# Patient Record
Sex: FEMALE | Race: WHITE | NOT HISPANIC OR LATINO | Employment: FULL TIME | URBAN - METROPOLITAN AREA
[De-identification: names, ages, dates, MRNs, and addresses within clinical notes are randomized per-mention and may not be internally consistent; named-entity substitution may affect disease eponyms.]

---

## 2023-12-26 ENCOUNTER — HOSPITAL ENCOUNTER (EMERGENCY)
Facility: HOSPITAL | Age: 48
Discharge: LEFT BEFORE DISCHARGE | DRG: 123 | End: 2023-12-27
Payer: COMMERCIAL

## 2023-12-26 ENCOUNTER — HOSPITAL ENCOUNTER (EMERGENCY)
Facility: HOSPITAL | Age: 48
Discharge: STILL ADMITTED OR ADMINISTRATIVE | End: 2023-12-27
Payer: COMMERCIAL

## 2023-12-26 ENCOUNTER — APPOINTMENT (OUTPATIENT)
Dept: RADIOLOGY | Facility: HOSPITAL | Age: 48
DRG: 123 | End: 2023-12-26
Payer: COMMERCIAL

## 2023-12-26 VITALS
SYSTOLIC BLOOD PRESSURE: 157 MMHG | DIASTOLIC BLOOD PRESSURE: 97 MMHG | OXYGEN SATURATION: 100 % | RESPIRATION RATE: 18 BRPM | HEART RATE: 94 BPM | TEMPERATURE: 98.2 F

## 2023-12-26 DIAGNOSIS — H46.9 OPTIC NEURITIS: Primary | ICD-10-CM

## 2023-12-26 LAB
ALBUMIN SERPL BCP-MCNC: 4.4 G/DL (ref 3.4–5)
ALP SERPL-CCNC: 51 U/L (ref 33–110)
ALT SERPL W P-5'-P-CCNC: 19 U/L (ref 7–45)
ANION GAP SERPL CALC-SCNC: 12 MMOL/L (ref 10–20)
APTT PPP: 42 SECONDS (ref 27–38)
AST SERPL W P-5'-P-CCNC: 20 U/L (ref 9–39)
BASOPHILS # BLD AUTO: 0.05 X10*3/UL (ref 0–0.1)
BASOPHILS NFR BLD AUTO: 0.5 %
BILIRUB DIRECT SERPL-MCNC: 0.1 MG/DL (ref 0–0.3)
BILIRUB SERPL-MCNC: 0.2 MG/DL (ref 0–1.2)
BUN SERPL-MCNC: 7 MG/DL (ref 6–23)
CALCIUM SERPL-MCNC: 9.9 MG/DL (ref 8.6–10.6)
CHLORIDE SERPL-SCNC: 107 MMOL/L (ref 98–107)
CO2 SERPL-SCNC: 26 MMOL/L (ref 21–32)
CREAT SERPL-MCNC: 0.61 MG/DL (ref 0.5–1.05)
EOSINOPHIL # BLD AUTO: 0.11 X10*3/UL (ref 0–0.7)
EOSINOPHIL NFR BLD AUTO: 1.2 %
ERYTHROCYTE [DISTWIDTH] IN BLOOD BY AUTOMATED COUNT: 16.2 % (ref 11.5–14.5)
GFR SERPL CREATININE-BSD FRML MDRD: >90 ML/MIN/1.73M*2
GLUCOSE SERPL-MCNC: 106 MG/DL (ref 74–99)
HCT VFR BLD AUTO: 36.5 % (ref 36–46)
HGB BLD-MCNC: 12.5 G/DL (ref 12–16)
IMM GRANULOCYTES # BLD AUTO: 0.04 X10*3/UL (ref 0–0.7)
IMM GRANULOCYTES NFR BLD AUTO: 0.4 % (ref 0–0.9)
INR PPP: 0.9 (ref 0.9–1.1)
LYMPHOCYTES # BLD AUTO: 3.5 X10*3/UL (ref 1.2–4.8)
LYMPHOCYTES NFR BLD AUTO: 37.9 %
MCH RBC QN AUTO: 28.7 PG (ref 26–34)
MCHC RBC AUTO-ENTMCNC: 34.2 G/DL (ref 32–36)
MCV RBC AUTO: 84 FL (ref 80–100)
MONOCYTES # BLD AUTO: 0.72 X10*3/UL (ref 0.1–1)
MONOCYTES NFR BLD AUTO: 7.8 %
NEUTROPHILS # BLD AUTO: 4.82 X10*3/UL (ref 1.2–7.7)
NEUTROPHILS NFR BLD AUTO: 52.2 %
NRBC BLD-RTO: 0 /100 WBCS (ref 0–0)
PLATELET # BLD AUTO: 462 X10*3/UL (ref 150–450)
POTASSIUM SERPL-SCNC: 3.5 MMOL/L (ref 3.5–5.3)
PROT SERPL-MCNC: 7.6 G/DL (ref 6.4–8.2)
PROTHROMBIN TIME: 10 SECONDS (ref 9.8–12.8)
RBC # BLD AUTO: 4.36 X10*6/UL (ref 4–5.2)
SODIUM SERPL-SCNC: 141 MMOL/L (ref 136–145)
WBC # BLD AUTO: 9.2 X10*3/UL (ref 4.4–11.3)

## 2023-12-26 PROCEDURE — 85610 PROTHROMBIN TIME: CPT | Performed by: NURSE PRACTITIONER

## 2023-12-26 PROCEDURE — 4500999001 HC ED NO CHARGE

## 2023-12-26 PROCEDURE — 99285 EMERGENCY DEPT VISIT HI MDM: CPT

## 2023-12-26 PROCEDURE — 80053 COMPREHEN METABOLIC PANEL: CPT | Performed by: NURSE PRACTITIONER

## 2023-12-26 PROCEDURE — 82248 BILIRUBIN DIRECT: CPT | Performed by: NURSE PRACTITIONER

## 2023-12-26 PROCEDURE — 70543 MRI ORBT/FAC/NCK W/O &W/DYE: CPT

## 2023-12-26 PROCEDURE — 36415 COLL VENOUS BLD VENIPUNCTURE: CPT | Performed by: NURSE PRACTITIONER

## 2023-12-26 PROCEDURE — 85730 THROMBOPLASTIN TIME PARTIAL: CPT | Performed by: NURSE PRACTITIONER

## 2023-12-26 PROCEDURE — 85025 COMPLETE CBC W/AUTO DIFF WBC: CPT | Performed by: NURSE PRACTITIONER

## 2023-12-26 PROCEDURE — 70553 MRI BRAIN STEM W/O & W/DYE: CPT

## 2023-12-26 PROCEDURE — 99285 EMERGENCY DEPT VISIT HI MDM: CPT | Performed by: NURSE PRACTITIONER

## 2023-12-26 RX ORDER — GADOTERATE MEGLUMINE 376.9 MG/ML
20 INJECTION INTRAVENOUS
Status: COMPLETED | OUTPATIENT
Start: 2023-12-26 | End: 2023-12-27

## 2023-12-26 ASSESSMENT — COLUMBIA-SUICIDE SEVERITY RATING SCALE - C-SSRS
1. IN THE PAST MONTH, HAVE YOU WISHED YOU WERE DEAD OR WISHED YOU COULD GO TO SLEEP AND NOT WAKE UP?: NO
2. HAVE YOU ACTUALLY HAD ANY THOUGHTS OF KILLING YOURSELF?: NO
6. HAVE YOU EVER DONE ANYTHING, STARTED TO DO ANYTHING, OR PREPARED TO DO ANYTHING TO END YOUR LIFE?: NO

## 2023-12-26 NOTE — ED TRIAGE NOTES
1 WK HX OF LEFT VISION LOST, SMALL AMOUNT OF PERIPHERAL REMAINS. PT WAS SEEN BY OPHTHO THURSDAY AND TOLD OPTIC NEURITIS. SHE REPORTS HA, NAUSEA. DENIES DIZZINESS, SYNCOPE, VOMITING.

## 2023-12-27 ENCOUNTER — TELEPHONE (OUTPATIENT)
Dept: OPHTHALMOLOGY | Facility: CLINIC | Age: 48
End: 2023-12-27
Payer: COMMERCIAL

## 2023-12-27 ENCOUNTER — HOSPITAL ENCOUNTER (INPATIENT)
Facility: HOSPITAL | Age: 48
LOS: 5 days | Discharge: HOME | DRG: 123 | End: 2024-01-01
Attending: STUDENT IN AN ORGANIZED HEALTH CARE EDUCATION/TRAINING PROGRAM | Admitting: PSYCHIATRY & NEUROLOGY
Payer: COMMERCIAL

## 2023-12-27 DIAGNOSIS — R76.8 ANA POSITIVE: ICD-10-CM

## 2023-12-27 DIAGNOSIS — H46.9 OPTIC NEURITIS, LEFT: Primary | ICD-10-CM

## 2023-12-27 DIAGNOSIS — F17.210 CIGARETTE SMOKER: ICD-10-CM

## 2023-12-27 PROBLEM — Z98.84 PERSONAL HISTORY OF GASTRIC BYPASS: Status: ACTIVE | Noted: 2023-12-27

## 2023-12-27 PROBLEM — E66.9 SIMPLE OBESITY: Status: ACTIVE | Noted: 2023-12-27

## 2023-12-27 PROBLEM — F41.8 DEPRESSION WITH ANXIETY: Status: ACTIVE | Noted: 2023-12-27

## 2023-12-27 LAB
ALBUMIN SERPL BCP-MCNC: 4.5 G/DL (ref 3.4–5)
ALP SERPL-CCNC: 55 U/L (ref 33–110)
ALT SERPL W P-5'-P-CCNC: 20 U/L (ref 7–45)
ANION GAP SERPL CALC-SCNC: 14 MMOL/L (ref 10–20)
AST SERPL W P-5'-P-CCNC: 27 U/L (ref 9–39)
BASOPHILS # BLD AUTO: 0.06 X10*3/UL (ref 0–0.1)
BASOPHILS NFR BLD AUTO: 0.7 %
BILIRUB SERPL-MCNC: 0.2 MG/DL (ref 0–1.2)
BUN SERPL-MCNC: 5 MG/DL (ref 6–23)
CALCIUM SERPL-MCNC: 10 MG/DL (ref 8.6–10.6)
CHLORIDE SERPL-SCNC: 107 MMOL/L (ref 98–107)
CO2 SERPL-SCNC: 23 MMOL/L (ref 21–32)
CREAT SERPL-MCNC: 0.64 MG/DL (ref 0.5–1.05)
EOSINOPHIL # BLD AUTO: 0.08 X10*3/UL (ref 0–0.7)
EOSINOPHIL NFR BLD AUTO: 0.9 %
ERYTHROCYTE [DISTWIDTH] IN BLOOD BY AUTOMATED COUNT: 15.6 % (ref 11.5–14.5)
GFR SERPL CREATININE-BSD FRML MDRD: >90 ML/MIN/1.73M*2
GLUCOSE SERPL-MCNC: 80 MG/DL (ref 74–99)
HCT VFR BLD AUTO: 35.4 % (ref 36–46)
HGB BLD-MCNC: 12.3 G/DL (ref 12–16)
IMM GRANULOCYTES # BLD AUTO: 0.02 X10*3/UL (ref 0–0.7)
IMM GRANULOCYTES NFR BLD AUTO: 0.2 % (ref 0–0.9)
LYMPHOCYTES # BLD AUTO: 2.8 X10*3/UL (ref 1.2–4.8)
LYMPHOCYTES NFR BLD AUTO: 32.4 %
MCH RBC QN AUTO: 28 PG (ref 26–34)
MCHC RBC AUTO-ENTMCNC: 34.7 G/DL (ref 32–36)
MCV RBC AUTO: 81 FL (ref 80–100)
MONOCYTES # BLD AUTO: 0.52 X10*3/UL (ref 0.1–1)
MONOCYTES NFR BLD AUTO: 6 %
NEUTROPHILS # BLD AUTO: 5.17 X10*3/UL (ref 1.2–7.7)
NEUTROPHILS NFR BLD AUTO: 59.8 %
NRBC BLD-RTO: 0 /100 WBCS (ref 0–0)
PLATELET # BLD AUTO: 165 X10*3/UL (ref 150–450)
POTASSIUM SERPL-SCNC: 3.9 MMOL/L (ref 3.5–5.3)
PROT SERPL-MCNC: 8.2 G/DL (ref 6.4–8.2)
RBC # BLD AUTO: 4.4 X10*6/UL (ref 4–5.2)
SODIUM SERPL-SCNC: 140 MMOL/L (ref 136–145)
WBC # BLD AUTO: 8.7 X10*3/UL (ref 4.4–11.3)

## 2023-12-27 PROCEDURE — 99285 EMERGENCY DEPT VISIT HI MDM: CPT

## 2023-12-27 PROCEDURE — 36415 COLL VENOUS BLD VENIPUNCTURE: CPT

## 2023-12-27 PROCEDURE — S4991 NICOTINE PATCH NONLEGEND: HCPCS

## 2023-12-27 PROCEDURE — 2500000001 HC RX 250 WO HCPCS SELF ADMINISTERED DRUGS (ALT 637 FOR MEDICARE OP): Performed by: STUDENT IN AN ORGANIZED HEALTH CARE EDUCATION/TRAINING PROGRAM

## 2023-12-27 PROCEDURE — 80053 COMPREHEN METABOLIC PANEL: CPT | Performed by: STUDENT IN AN ORGANIZED HEALTH CARE EDUCATION/TRAINING PROGRAM

## 2023-12-27 PROCEDURE — 94760 N-INVAS EAR/PLS OXIMETRY 1: CPT

## 2023-12-27 PROCEDURE — 2500000004 HC RX 250 GENERAL PHARMACY W/ HCPCS (ALT 636 FOR OP/ED): Performed by: STUDENT IN AN ORGANIZED HEALTH CARE EDUCATION/TRAINING PROGRAM

## 2023-12-27 PROCEDURE — 99285 EMERGENCY DEPT VISIT HI MDM: CPT | Performed by: STUDENT IN AN ORGANIZED HEALTH CARE EDUCATION/TRAINING PROGRAM

## 2023-12-27 PROCEDURE — 2500000001 HC RX 250 WO HCPCS SELF ADMINISTERED DRUGS (ALT 637 FOR MEDICARE OP)

## 2023-12-27 PROCEDURE — 85025 COMPLETE CBC W/AUTO DIFF WBC: CPT

## 2023-12-27 PROCEDURE — 2500000002 HC RX 250 W HCPCS SELF ADMINISTERED DRUGS (ALT 637 FOR MEDICARE OP, ALT 636 FOR OP/ED)

## 2023-12-27 PROCEDURE — 2550000001 HC RX 255 CONTRASTS: Performed by: NURSE PRACTITIONER

## 2023-12-27 PROCEDURE — 99223 1ST HOSP IP/OBS HIGH 75: CPT | Performed by: STUDENT IN AN ORGANIZED HEALTH CARE EDUCATION/TRAINING PROGRAM

## 2023-12-27 PROCEDURE — A9575 INJ GADOTERATE MEGLUMI 0.1ML: HCPCS | Performed by: NURSE PRACTITIONER

## 2023-12-27 PROCEDURE — 70543 MRI ORBT/FAC/NCK W/O &W/DYE: CPT | Performed by: SURGERY

## 2023-12-27 PROCEDURE — 1100000001 HC PRIVATE ROOM DAILY

## 2023-12-27 PROCEDURE — 70553 MRI BRAIN STEM W/O & W/DYE: CPT | Performed by: SURGERY

## 2023-12-27 PROCEDURE — 96372 THER/PROPH/DIAG INJ SC/IM: CPT | Performed by: STUDENT IN AN ORGANIZED HEALTH CARE EDUCATION/TRAINING PROGRAM

## 2023-12-27 RX ORDER — ENOXAPARIN SODIUM 100 MG/ML
40 INJECTION SUBCUTANEOUS EVERY 24 HOURS
Status: DISCONTINUED | OUTPATIENT
Start: 2023-12-27 | End: 2024-01-01 | Stop reason: HOSPADM

## 2023-12-27 RX ORDER — ENOXAPARIN SODIUM 100 MG/ML
40 INJECTION SUBCUTANEOUS EVERY 24 HOURS
Status: CANCELLED | OUTPATIENT
Start: 2023-12-27

## 2023-12-27 RX ORDER — LORAZEPAM 1 MG/1
1 TABLET ORAL ONCE AS NEEDED
Status: COMPLETED | OUTPATIENT
Start: 2023-12-27 | End: 2023-12-28

## 2023-12-27 RX ORDER — AMOXICILLIN 250 MG
1 CAPSULE ORAL NIGHTLY PRN
Status: DISCONTINUED | OUTPATIENT
Start: 2023-12-27 | End: 2024-01-01 | Stop reason: HOSPADM

## 2023-12-27 RX ORDER — IBUPROFEN 200 MG
1 TABLET ORAL DAILY
Status: CANCELLED | OUTPATIENT
Start: 2023-12-27

## 2023-12-27 RX ORDER — ACETAMINOPHEN 325 MG/1
650 TABLET ORAL EVERY 4 HOURS PRN
Status: DISCONTINUED | OUTPATIENT
Start: 2023-12-27 | End: 2023-12-30

## 2023-12-27 RX ORDER — LORAZEPAM 0.5 MG/1
0.5 TABLET ORAL ONCE
Status: COMPLETED | OUTPATIENT
Start: 2023-12-27 | End: 2023-12-27

## 2023-12-27 RX ORDER — ACETAMINOPHEN 500 MG
5 TABLET ORAL NIGHTLY PRN
Status: DISCONTINUED | OUTPATIENT
Start: 2023-12-27 | End: 2024-01-01 | Stop reason: HOSPADM

## 2023-12-27 RX ORDER — LANOLIN ALCOHOL/MO/W.PET/CERES
500 CREAM (GRAM) TOPICAL DAILY
Status: COMPLETED | OUTPATIENT
Start: 2023-12-27 | End: 2023-12-29

## 2023-12-27 RX ORDER — IBUPROFEN 200 MG
1 TABLET ORAL DAILY
Status: DISCONTINUED | OUTPATIENT
Start: 2023-12-27 | End: 2024-01-01 | Stop reason: HOSPADM

## 2023-12-27 RX ORDER — LORAZEPAM 2 MG/ML
1 INJECTION INTRAMUSCULAR ONCE AS NEEDED
Status: DISCONTINUED | OUTPATIENT
Start: 2023-12-27 | End: 2023-12-27

## 2023-12-27 RX ADMIN — THIAMINE HCL TAB 100 MG 500 MG: 100 TAB at 18:09

## 2023-12-27 RX ADMIN — ENOXAPARIN SODIUM 40 MG: 100 INJECTION SUBCUTANEOUS at 18:09

## 2023-12-27 RX ADMIN — LORAZEPAM 0.5 MG: 0.5 TABLET ORAL at 14:04

## 2023-12-27 RX ADMIN — NICOTINE 1 PATCH: 14 PATCH, EXTENDED RELEASE TRANSDERMAL at 18:09

## 2023-12-27 RX ADMIN — GADOTERATE MEGLUMINE 17 ML: 376.9 INJECTION INTRAVENOUS at 00:02

## 2023-12-27 SDOH — SOCIAL STABILITY: SOCIAL INSECURITY: DO YOU FEEL ANYONE HAS EXPLOITED OR TAKEN ADVANTAGE OF YOU FINANCIALLY OR OF YOUR PERSONAL PROPERTY?: NO

## 2023-12-27 SDOH — SOCIAL STABILITY: SOCIAL INSECURITY: ABUSE: ADULT

## 2023-12-27 SDOH — SOCIAL STABILITY: SOCIAL INSECURITY: ARE YOU OR HAVE YOU BEEN THREATENED OR ABUSED PHYSICALLY, EMOTIONALLY, OR SEXUALLY BY ANYONE?: NO

## 2023-12-27 SDOH — SOCIAL STABILITY: SOCIAL INSECURITY: HAS ANYONE EVER THREATENED TO HURT YOUR FAMILY OR YOUR PETS?: NO

## 2023-12-27 SDOH — SOCIAL STABILITY: SOCIAL INSECURITY: ARE THERE ANY APPARENT SIGNS OF INJURIES/BEHAVIORS THAT COULD BE RELATED TO ABUSE/NEGLECT?: NO

## 2023-12-27 SDOH — SOCIAL STABILITY: SOCIAL INSECURITY: HAVE YOU HAD THOUGHTS OF HARMING ANYONE ELSE?: NO

## 2023-12-27 SDOH — SOCIAL STABILITY: SOCIAL INSECURITY: DOES ANYONE TRY TO KEEP YOU FROM HAVING/CONTACTING OTHER FRIENDS OR DOING THINGS OUTSIDE YOUR HOME?: NO

## 2023-12-27 SDOH — SOCIAL STABILITY: SOCIAL INSECURITY: DO YOU FEEL UNSAFE GOING BACK TO THE PLACE WHERE YOU ARE LIVING?: NO

## 2023-12-27 SDOH — SOCIAL STABILITY: SOCIAL INSECURITY: WERE YOU ABLE TO COMPLETE ALL THE BEHAVIORAL HEALTH SCREENINGS?: YES

## 2023-12-27 ASSESSMENT — COGNITIVE AND FUNCTIONAL STATUS - GENERAL
PATIENT BASELINE BEDBOUND: NO
MOBILITY SCORE: 24
MOBILITY SCORE: 24
DAILY ACTIVITIY SCORE: 24

## 2023-12-27 ASSESSMENT — LIFESTYLE VARIABLES
HOW OFTEN DURING THE LAST YEAR HAVE YOU NEEDED AN ALCOHOLIC DRINK FIRST THING IN THE MORNING TO GET YOURSELF GOING AFTER A NIGHT OF HEAVY DRINKING: LESS THAN MONTHLY
PRESCIPTION_ABUSE_PAST_12_MONTHS: NO
SUBSTANCE_ABUSE_PAST_12_MONTHS: NO
HOW OFTEN DURING THE LAST YEAR HAVE YOU FAILED TO DO WHAT WAS NORMALLY EXPECTED FROM YOU BECAUSE OF DRINKING: NEVER
HOW OFTEN DURING THE LAST YEAR HAVE YOU BEEN UNABLE TO REMEMBER WHAT HAPPENED THE NIGHT BEFORE BECAUSE YOU HAD BEEN DRINKING: LESS THAN MONTHLY
AUDIT TOTAL SCORE: 15
HOW MANY STANDARD DRINKS CONTAINING ALCOHOL DO YOU HAVE ON A TYPICAL DAY: 7 TO 9
HOW OFTEN DURING THE LAST YEAR HAVE YOU HAD A FEELING OF GUILT OR REMORSE AFTER DRINKING: WEEKLY
HOW OFTEN DO YOU HAVE A DRINK CONTAINING ALCOHOL: 4 OR MORE TIMES A WEEK
AUDIT TOTAL SCORE: 6
SKIP TO QUESTIONS 9-10: 0
HAVE YOU OR SOMEONE ELSE BEEN INJURED AS A RESULT OF YOUR DRINKING: NO
AUDIT-C TOTAL SCORE: 9
AUDIT-C TOTAL SCORE: 9
HOW OFTEN DURING THE LAST YEAR HAVE YOU FOUND THAT YOU WERE NOT ABLE TO STOP DRINKING ONCE YOU HAD STARTED: LESS THAN MONTHLY
HOW OFTEN DO YOU HAVE 6 OR MORE DRINKS ON ONE OCCASION: MONTHLY
HAS A RELATIVE, FRIEND, DOCTOR, OR ANOTHER HEALTH PROFESSIONAL EXPRESSED CONCERN ABOUT YOUR DRINKING OR SUGGESTED YOU CUT DOWN: NO

## 2023-12-27 ASSESSMENT — PAIN SCALES - GENERAL
PAINLEVEL_OUTOF10: 1
PAINLEVEL_OUTOF10: 0 - NO PAIN
PAINLEVEL_OUTOF10: 0 - NO PAIN

## 2023-12-27 ASSESSMENT — ACTIVITIES OF DAILY LIVING (ADL)
HEARING - RIGHT EAR: FUNCTIONAL
TOILETING: INDEPENDENT
BATHING: INDEPENDENT
HEARING - LEFT EAR: FUNCTIONAL
PATIENT'S MEMORY ADEQUATE TO SAFELY COMPLETE DAILY ACTIVITIES?: YES
FEEDING YOURSELF: INDEPENDENT
ADEQUATE_TO_COMPLETE_ADL: YES
LACK_OF_TRANSPORTATION: NO
WALKS IN HOME: INDEPENDENT
GROOMING: INDEPENDENT
DRESSING YOURSELF: INDEPENDENT
JUDGMENT_ADEQUATE_SAFELY_COMPLETE_DAILY_ACTIVITIES: YES

## 2023-12-27 ASSESSMENT — PAIN - FUNCTIONAL ASSESSMENT: PAIN_FUNCTIONAL_ASSESSMENT: 0-10

## 2023-12-27 ASSESSMENT — PATIENT HEALTH QUESTIONNAIRE - PHQ9
1. LITTLE INTEREST OR PLEASURE IN DOING THINGS: NOT AT ALL
SUM OF ALL RESPONSES TO PHQ9 QUESTIONS 1 & 2: 0
2. FEELING DOWN, DEPRESSED OR HOPELESS: NOT AT ALL

## 2023-12-27 ASSESSMENT — COLUMBIA-SUICIDE SEVERITY RATING SCALE - C-SSRS
2. HAVE YOU ACTUALLY HAD ANY THOUGHTS OF KILLING YOURSELF?: NO
1. IN THE PAST MONTH, HAVE YOU WISHED YOU WERE DEAD OR WISHED YOU COULD GO TO SLEEP AND NOT WAKE UP?: NO
6. HAVE YOU EVER DONE ANYTHING, STARTED TO DO ANYTHING, OR PREPARED TO DO ANYTHING TO END YOUR LIFE?: NO

## 2023-12-27 ASSESSMENT — PAIN DESCRIPTION - PAIN TYPE: TYPE: ACUTE PAIN

## 2023-12-27 NOTE — TELEPHONE ENCOUNTER
Reached out and contacted patient ~0900 12/27/23. Discussed with patient that recommendation from ophthalmology standpoint following MRI in the ED is for neurology consultation and likely admission for steroid treatment. Reiterated to patient that without treatment she is running the risk of permanent vision loss due to optic neuritis, but also runs the risk on systemic issues given findings on MRI concerning for a demyelinating process. Patient states that she was under the impression vision would improve without treatment. States she will plan to report back to [facility] ED for further evaluation.     Please refer to full ophthalmology consultation note filed 12/17 AM for full recommendations and examination when patient presents to ED.

## 2023-12-27 NOTE — ED PROVIDER NOTES
HPI   Chief Complaint   Patient presents with   • Eye Problem       This patient is a 48-year-old female with past medical history of anxiety, gastric bypass presenting today for optic neuritis.  Reports that she has had acute vision loss in the left eye since last Monday.  Seen in this ED yesterday and evaluated by ophthalmology for his acute vision loss.  Received MRIs of brain and orbit revealing Scattered periventricular and subcortical nonenhancing T2 and FLAIR white matter hyperintensities within bilateral cerebral hemisphere and asymmetric enhancement of the left optic nerve, compatible with optic neuritis.  This patient did unfortunately leave last night AMA prior to MRI results coming back.  She was then contacted per ophthalmology resident and told that she needs to come back for IV steroids as she may lose her vision in that left eye.  Patient is endorsing anxiety at this time but no other symptoms.                            No data recorded                Patient History   No past medical history on file.  No past surgical history on file.  No family history on file.  Social History     Tobacco Use   • Smoking status: Former     Types: Cigarettes   • Smokeless tobacco: Never   Substance Use Topics   • Alcohol use: Not on file   • Drug use: Not on file       Physical Exam   ED Triage Vitals [12/27/23 1320]   Temp Heart Rate Resp BP   37 °C (98.6 °F) 101 20 156/77      SpO2 Temp Source Heart Rate Source Patient Position   100 % Oral -- --      BP Location FiO2 (%)     -- 21 %       Physical Exam  Vitals and nursing note reviewed.   Constitutional:       General: She is not in acute distress.     Appearance: Normal appearance. She is not ill-appearing.   HENT:      Head: Normocephalic and atraumatic.      Right Ear: External ear normal.      Left Ear: External ear normal.      Nose: Nose normal. No congestion or rhinorrhea.      Mouth/Throat:      Mouth: Mucous membranes are moist.      Pharynx: Oropharynx  is clear. No oropharyngeal exudate or posterior oropharyngeal erythema.   Eyes:      General: Visual field deficit (central visual changes to left eye) present.      Extraocular Movements: Extraocular movements intact.      Conjunctiva/sclera: Conjunctivae normal.      Pupils: Pupils are equal, round, and reactive to light.   Cardiovascular:      Rate and Rhythm: Normal rate and regular rhythm.      Heart sounds: Normal heart sounds.   Pulmonary:      Effort: No accessory muscle usage or respiratory distress.      Breath sounds: Normal breath sounds. No wheezing, rhonchi or rales.   Abdominal:      General: Abdomen is flat. Bowel sounds are normal. There is no distension.      Palpations: Abdomen is soft.      Tenderness: There is no abdominal tenderness. There is no right CVA tenderness or left CVA tenderness.   Musculoskeletal:         General: No swelling or deformity. Normal range of motion.      Cervical back: Normal range of motion and neck supple.      Right lower leg: No edema.      Left lower leg: No edema.   Skin:     General: Skin is warm and dry.      Capillary Refill: Capillary refill takes less than 2 seconds.   Neurological:      General: No focal deficit present.      Mental Status: She is alert and oriented to person, place, and time.      GCS: GCS eye subscore is 4. GCS verbal subscore is 5. GCS motor subscore is 6.      Sensory: No sensory deficit.      Motor: Motor function is intact. No weakness.   Psychiatric:         Mood and Affect: Mood and affect normal.         Speech: Speech normal.         Behavior: Behavior normal. Behavior is cooperative.         ED Course & MDM   ED Course as of 12/27/23 1635   Wed Dec 27, 2023   1503 Comprehensive metabolic panel(!)  No electrolyte abnormalities, ABRIL or elevated LFTs [ML]   1503 Neuro consulted [ML]   1635 CBC and Auto Differential(!)  No leukocytosis or acute anemia [ML]      ED Course User Index  [ML] Barbara Suh PA-C         Diagnoses as of  23 1635   Optic neuritis, left       Medical Decision Making  This patient is a 48-year-old female presenting today for optic neuritis.  Was here yesterday for evaluation of the left eye visual changes.  She left AMA prior to receiving MRI results of orbit and brain though was called overnight by ophthalmology and was told to come back in as results showed optic neuritis and concerning signs of MS.    MRI of brain and orbit revealin. Scattered periventricular and subcortical nonenhancing T2 and  FLAIR white matter hyperintensities within bilateral cerebral  hemispheres are nonspecific, with the most common differential  possibilities including sequelae of chronic microangiopathy versus  inflammatory demyelinating disease.  2. Asymmetric enhancement of the left optic nerve, compatible with  optic neuritis.  3. No evidence of acute intracranial abnormality. No other abnormal  intracranial enhancement.    Basic labs were drawn and neurology was consulted for likely admission to hospital for IV steroids.  No obvious focal deficits other than left eye visual deficit in the central vision.  Patient does appear anxious and is tearful on arrival.  Requesting something for anxiety which was given.    Neurology evaluated this patient and agreed that they would admit under their care for further evaluation and IV steroids to treat her optic neuritis.        Procedure  Procedures     Barbara Suh PA-C  [id number]

## 2023-12-27 NOTE — ED TRIAGE NOTES
Pt to ed after being seen and left AMA last night after having an Mri for acute vision loss in her left eye since last Monday. Pt called to return by dr Serra for high dose steroid. MRI showed acute neuritis with possible lesion.

## 2023-12-27 NOTE — ED PROVIDER NOTES
Emergency Department Encounter  [facility] EMERGENCY MEDICINE    Patient: Margarita Suresh  MRN: [id number]  : 1975  Date of Evaluation: 2023  ED Provider: MEL Selby      Chief Complaint       Chief Complaint   Patient presents with    OPTIC NEURITIS     [place]    (Location/Symptom, Timing/Onset, Context/Setting, Quality, Duration, Modifying Factors, Severity) Note limiting factors.   Limitations to History: none  Historian: self  Records reviewed: EMR inpatient and outpatient notes, Care Everywhere      Margarita Suresh is a 48 y.o. female who presents to the emergency department complaining of left eye change in vision since Monday, states that she saw ophthalmology at [facility] on Thursday and was diagnosed with likely optic neuritis, unable to obtain her MRI until middle , spoke with her doctor who was concerned about the timeline, is concerned that she had a stroke in her eye.  States her symptoms began on Monday where she had blurry vision to the left eye, unable to see out of the left eye, gradual onset on Monday, states that she can see shapes and colors but no distinct features out of the left eye.  Does wear glasses, does not wear contacts.  Denies any head injury.  Has an intermittent headache to the left side since Monday as well.  States she had similar symptoms a few years prior and diagnosed with an optic migraine but states that those symptoms improved, has had no improvement of her vision since Monday.    ROS:     Review of Systems  14 systems reviewed and otherwise acutely negative except as in the [place].          Past History   No past medical history on file.  No past surgical history on file.  Social History     Socioeconomic History    Marital status:      Spouse name: Not on file    Number of children: Not on file    Years of education: Not on file    Highest education level: Not on file   Occupational History    Not on file   Tobacco Use     Smoking status: Not on file    Smokeless tobacco: Not on file   Substance and Sexual Activity    Alcohol use: Not on file    Drug use: Not on file    Sexual activity: Not on file   Other Topics Concern    Not on file   Social History Narrative    Not on file     Social Determinants of Health     Financial Resource Strain: Not on file   Food Insecurity: Not on file   Transportation Needs: Not on file   Physical Activity: Not on file   Stress: Not on file   Social Connections: Not on file   Intimate Partner Violence: Not on file   Housing Stability: Not on file       Medications/Allergies     Previous Medications    No medications on file     No Known Allergies     Physical Exam       ED Triage Vitals [12/26/23 1825]   Temp Heart Rate Resp BP   36.8 °C (98.2 °F) 94 18 (!) 157/97      SpO2 Temp src Heart Rate Source Patient Position   100 % -- -- --      BP Location FiO2 (%)     -- --         Physical Exam    GENERAL:  The patient appears nourished and normally developed. Vital signs as documented.     HEENT:  Head normocephalic, atraumatic, EOMs intact, PERRLA, Mucous membranes moist. Nares patent without copious rhinorrhea.  No lymphadenopathy.    PULMONARY:  Lungs are clear to auscultation, without any respiratory distress. Able to speak full sentences, no accessory muscle use    CARDIAC:   Normal rate. No murmurs, rubs or gallops    ABDOMEN:  Soft, non distended, non tender, BS positive x 4 quadrants, No rebound or guarding, no peritoneal signs, no CVA tenderness, no masses or organomegaly    MUSCULOSKELETAL:   Able to ambulate, Non edematous, with no obvious deformities. Pulses intact distal    SKIN:   Good color, with no significant rashes.  No pallor.    NEURO:  No obvious neurological deficits, normal sensation and strength bilaterally.  Able to follow commands, NIH 0, CN 2-12 intact.      Diagnostics   Labs:  Labs Reviewed   CBC WITH AUTO DIFFERENTIAL - Abnormal       Result Value    WBC 9.2      nRBC 0.0       RBC 4.36      Hemoglobin 12.5      Hematocrit 36.5      MCV 84      MCH 28.7      MCHC 34.2      RDW 16.2 (*)     Platelets 462 (*)     Neutrophils % 52.2      Immature Granulocytes %, Automated 0.4      Lymphocytes % 37.9      Monocytes % 7.8      Eosinophils % 1.2      Basophils % 0.5      Neutrophils Absolute 4.82      Immature Granulocytes Absolute, Automated 0.04      Lymphocytes Absolute 3.50      Monocytes Absolute 0.72      Eosinophils Absolute 0.11      Basophils Absolute 0.05     BASIC METABOLIC PANEL - Abnormal    Glucose 106 (*)     Sodium 141      Potassium 3.5      Chloride 107      Bicarbonate 26      Anion Gap 12      Urea Nitrogen 7      Creatinine 0.61      eGFR >90      Calcium 9.9     APTT - Abnormal    aPTT 42 (*)     Narrative:     The APTT is no longer used for monitoring Unfractionated Heparin Therapy. For monitoring Heparin Therapy, use the Heparin Assay.   HEPATIC FUNCTION PANEL - Normal    Albumin 4.4      Bilirubin, Total 0.2      Bilirubin, Direct 0.1      Alkaline Phosphatase 51      ALT 19      AST 20      Total Protein 7.6     PROTIME-INR - Normal    Protime 10.0      INR 0.9       Radiographs:  MR brain w and wo IV contrast         MR orbit w and wo IV contrast                   Assessment   In brief, Margarita Suresh is a 48 y.o. female who presented to the emergency department for blurry vision in the left eye    Plan   IV, lab work, imaging, pathology consult    Differentials   Optic neuritis  MS  Malignancy  Retinal artery occlusion  Retinopathy  Detachment    ED Course     Diagnoses as of 12/27/23 0106   Optic neuritis       Visit Vitals  BP (!) 157/97   Pulse 94   Temp 36.8 °C (98.2 °F)   Resp 18   SpO2 100%   OB Status Menopausal       Medications   gadoterate meglumine (Dotarem) 0.5 mmol/mL contrast injection 20 mL (17 mL intravenous Given 12/27/23 0002)       Plan of care discussed, patient is stable appearing, ophthalmology was notified of patient's arrival.  Plan is for  patient to obtain MRI.  Patient was seen by ophthalmology in the emergency department, sent for imaging, IV was established, labs were reviewed.  while waiting for imaging results patient stated that she wanted her IV removed and she wanted to go home.  Discussed with patient the reasons why we wanted to stay for the results.  Differentials to consider were malignancy, mass, multiple sclerosis or other demyelinating process.  Patient understands this and states that she would rather go home and states that it has been ongoing for the last week and she does not feel it is emergent and that we can call her tomorrow and she can figure it out as an outpatient.  MRI results preliminary read resulted while patient was discussing with this provider, patient was provided the results that there was some concern for demyelinating process versus other neurological issue and we recommend being evaluated by neurology in the setting of potential demyelinating disease with diagnosed optic neuritis.  Patient asked questions about treatments for demyelinating process and discussed steroids, states that she had spoken with other people as an outpatient and she would adamantly refuse taking any steroids so there would be no point in her staying.  Discussed with patient that she would need to get the opinion of a neurologist on next steps and patient understands this but does not want to stay for neurological consultation at this time, states that she would like to go home and go to sleep tonight and will figure it out tomorrow.  Discussed with patient the risks of leaving the emergency department at this time and patient is aware, is awake, alert, able to answer questions appropriately and left AGAINST MEDICAL ADVICE      Final Impression      1. Optic neuritis          DISPOSITION  Disposition:  Left Against Medical Advice  Patient condition is stable    Comment: Please note this report has been produced using speech recognition  software and may contain errors related to that system including errors in grammar, punctuation, and spelling, as well as words and phrases that may be inappropriate.  If there are any questions or concerns please feel free to contact the dictating provider for clarification.    MEL Selby APRN-CNP  12/27/23 0109

## 2023-12-27 NOTE — CONSULTS
Reason for consult: Vision loss left eye     HPI: 48 year old female no past ocular history presents for 2 weeks of blurred vision left eye. Patient states that she has noticed decreased vision left eye without associated pain, flashes/floaters. Vision started as becoming blurrier and more fuzzy but has developed into poor vision overall with mainly just shadows and movement seen out of that eye. States something similar but less severe happened last year which was dx as an ocular migraine and resolved itself. Patient endorses limb tingling, burning, ?weakness at times over months intermittently without obvious inciting factors. Does say that work has been more stressful in the last weeks before vision changes. No fmaily hx of MS, neurologic conditions.     Past Medical History: as above  Family History: reviewed and not pertinent to chief complaint  Medications: please refer to medication reconciliation  Allergies: please refer to patient allergy list    OCULAR EXAMINATION:  Near visual acuity (VA) right eye 20/20, left eye CF 2'  Pupils: 4>2 both eyes (+) relative afferent pupillary defect (RAPD) left eye   IOP: 12/14  Motility: EOM full both eyes, nonpainful without diplopia   Confrontation visual fields: Full to count fingers right eye, FTHM left eye   Color: 11/11 right eye, unable left eye     ANTERIOR SEGMENT:  OD:  Lids/Lashes: normal anatomy and position  Conjunctiva: white and quiet  Cornea: clear  AC: deep and quiet  Iris: round and reactive  Lens:    OS:  Lids/Lashes: normal anatomy and position  Conjunctiva: white and quiet  Cornea: clear  AC: deep and quiet  Iris: round and reactive  Lens:     DILATED FUNDUS EXAM: (Dilated with 1% tropicamide and 2.5% phenylephrine)    OD:  Cup-to-disc ratio: 0.35  Optic nerve: pink with sharp margins   Vitreous: clear  Macula: flat and attached without lesions  Vessels: normal course and caliber  Periphery: no holes, tears, or detachments    OS:  Cup-to-disc ratio:  0.35  Optic nerve: pink with sharp margins   Vitreous: clear  Macula: flat and attached without lesions  Vessels: normal course and caliber  Periphery: no holes, tears, or detachments    Imaging:  MRI brain/orbit w and wo contrast, with fat suppression, personally reviewed  -Left optic nerve enhancement, diffuse bilateral cerebral hyperintesities     Assessment:  This is a 48 year old female no past ocular history presents with decreased vision left eye to , new relative afferent pupillary defect (RAPD) concerning for optic neuritis left eye per outside providers. Anterior segment examination wnl both eyes, dilated fundus exam without optic nerve pathology. MRI orbit/brain w/wo contrast with fat suppression shows left optic nerve enhancement, diffuse bilateral cerebral hyperintensities. This presentation and workup is concerning for retrobulbar optic neuritis left eye along with possible demyelinating process systemically. Recommend further workup with neurology consultation and probable admission with IV steroids.     Recommendations:  #Optic neuritis left eye   - MRI showing left optic nerve enhancement concerning for optic neuritis  - Examination concerning considering poor visual acuity (VA) and relative afferent pupillary defect (RAPD) left eye   - Recommend neurology consultation for initiation of systemic corticosteroids and further workup for cerebral MRI findings  - Ophthalmology will plan to follow patient while admitted    - Importance of treatment given poor vision stressed to patient during examination. However, patient [place] [facility] [place] before speaking with ophthalmology further about recommendations and imaging. ED staff discussed with patient and informed her of the possibility of permanent vision loss. Patient continues to voice that she would like to leave the hospital, and was able to voice understanding of above risks.   - Considering vision threatening diagnosis with treatment  options, ophthalmology will attempt to contact patient to arrange for representation for neurology consultation and admission.       Thank you for the consult. Please contact the Ophthalmology service with further questions or concerns.    Juan Alberto Serra MD   Ophthalmology PGY2    Ophthalmology Adult Pager - [phone]  Ophthalmology Pediatrics Pager - [phone]     For adult follow-up appointments, call: [phone]  For pediatric follow-up appointments, call: [phone]

## 2023-12-27 NOTE — H&P
"History Of Present Illness  Margarita Suresh is a 48 y.o. woman with no significant medical history admitted for management of optic neuritis in the setting of acute vision loss of left eye.     Patient reports symptoms x9 days which involve vision loss worse in central vision fields of left eye. Reports some vision, but  mostly light sensitivity in peripheral fields. Reports an intermittent bilateral retro-orbital headache, reports that she does have occasional migraines. On ROS reports bilateral hand and feet \"tingling\" and bilateral forearm pain. However denies weakness, does endorse some remote short lived weakness of legs (unsure about laterality). Denies urine/stool incontinence although she will occasionally have some urinary incontinence when sneezing. Reports fatigue worsened with hot environments as well as some nausea and dizziness with neck flexion and extension. Also reports some \"brain fog\" x1 year. Denies diplopia, seizures or stroke-like symptoms.     Reports that 2 weeks ago had normal eye examination with optometrist. Wears glasses and did not require change in prescription. Then saw ophthalmologist at [facility] approximately a week ago who suspected optic neuritis and recommended MRI and neurology. Because outpatient MRI wasn't going to be possible until middle of January, was advised to come into  ED. Patient reports similar though milder symptoms 1 year ago evaluated by ophthalmologist which were thought to be ocular migraines.    Presented to  ED 12/16 and had CBC, CMP and coags which were unremarkable. Brain MRI reported \"...Scattered periventricular and subcortical nonenhancing T2 and  FLAIR white matter hyperintensities within bilateral cerebral hemispheres... Asymmetric enhancement of the left optic nerve, compatible with optic neuritis... No evidence of acute intracranial abnormality. No other abnormal intracranial enhancement.\" Ophthalmology performed dilated eye exam which was concerning for " poor visual acuity and relative afferent pupillary defect of left eye concerning for retrobulbar optic neuritis of left eye. Patient left AMA last night but did return today after getting a good night's sleep.    Past Medical History  No past medical history on file.  Surgical History  No past surgical history on file.  Social History  Social History     Tobacco Use    Smoking status: Former     Types: Cigarettes    Smokeless tobacco: Never     Allergies  Patient has no known allergies.  (Not in a hospital admission)    Review of Systems   All other systems reviewed and are negative.    Physical Exam  Vitals reviewed.   Constitutional:       Appearance: Normal appearance.   HENT:      Head: Normocephalic.      Right Ear: External ear normal.      Left Ear: External ear normal.      Nose: Nose normal.      Mouth/Throat:      Mouth: Mucous membranes are moist.      Pharynx: Oropharynx is clear.   Cardiovascular:      Rate and Rhythm: Normal rate and regular rhythm.      Pulses: Normal pulses.      Heart sounds: Normal heart sounds.   Pulmonary:      Effort: Pulmonary effort is normal.      Breath sounds: Normal breath sounds.   Musculoskeletal:         General: Normal range of motion.      Cervical back: Normal range of motion and neck supple.   Skin:     General: Skin is warm and dry.   Neurological:      Mental Status: She is alert.     MENTAL STATUS:  Orientation: AxOx3  Language: Expression, repetition, naming, comprehension intact  Follows complex commands across midline  Thought processes: Logical, organized  Memory: not formally tested  Calculation: not formally tested  Concentration: Intact  Fund of knowledge: Appropriate  Judgment and Insight: Intact    CRANIAL NERVES:  - Fundoscopic exam: ... Not done  - II/III: PERRL  - II:   Visual fields intact to confrontation on right, central vision fields lost on left with intact periphera  - III, IV, VI: EOM full to pursuit without nystagmus  - V: V1-V3 sensation  "intact bilaterally  - VII: Face muscles symmetric with smile and eye closure  - VIII: Intact to interview  - IX, X: Palate elevated symmetrically bilaterally, no hoarseness  - XI: 5/5 strength on shoulder shrugging bilaterally  - XII: Tongue midline without atrophy or fasciculation    MOTOR: Tone and bulk normal in all extremities. No tremor, no abnormal movements.    STRENGTH: R L  Deltoid  5 5  Biceps  5 5  Triceps  5 5    5 5  Thumb Abd 5 5  Finger Abd 5 5    Hip abduction 5 5  Hip adduction 5 5  Hip flexion 5 5  Quadriceps 5 5  Hamstrings 5 5  DorsiFlex 5 5  PlantarFlex 5 5    REFLEXES: R L  Biceps  +2 +2  Triceps  +2 +2  BR  +2 +2  Patellar  +2 +2  Achilles +2 +2  Plantar  Down Down    No crossed adductors  No clonus    COORDINATION: Intact on finger to nose bl  SENSORY: Intact to light touch bl UE and LE  PROPRIOCEPTION:  not formally tested  ROMBERG: Negative  GAIT: Normal standard gait, able to toe, heel, and tandem walk     Last Recorded Vitals  Blood pressure 156/77, pulse 101, temperature 37 °C (98.6 °F), temperature source Oral, resp. rate 20, height 1.6 m (5' 3\"), weight 86.6 kg (190 lb 14.7 oz), last menstrual period 12/26/2023, SpO2 100 %.    Relevant Results  Results for orders placed or performed during the hospital encounter of 12/27/23 (from the past 24 hour(s))   Comprehensive metabolic panel   Result Value Ref Range    Glucose 80 74 - 99 mg/dL    Sodium 140 136 - 145 mmol/L    Potassium 3.9 3.5 - 5.3 mmol/L    Chloride 107 98 - 107 mmol/L    Bicarbonate 23 21 - 32 mmol/L    Anion Gap 14 10 - 20 mmol/L    Urea Nitrogen 5 (L) 6 - 23 mg/dL    Creatinine 0.64 0.50 - 1.05 mg/dL    eGFR >90 >60 mL/min/1.73m*2    Calcium 10.0 8.6 - 10.6 mg/dL    Albumin 4.5 3.4 - 5.0 g/dL    Alkaline Phosphatase 55 33 - 110 U/L    Total Protein 8.2 6.4 - 8.2 g/dL    AST 27 9 - 39 U/L    Bilirubin, Total 0.2 0.0 - 1.2 mg/dL    ALT 20 7 - 45 U/L   CBC and Auto Differential   Result Value Ref Range    WBC 8.7 4.4 - " 11.3 x10*3/uL    nRBC 0.0 0.0 - 0.0 /100 WBCs    RBC 4.40 4.00 - 5.20 x10*6/uL    Hemoglobin 12.3 12.0 - 16.0 g/dL    Hematocrit 35.4 (L) 36.0 - 46.0 %    MCV 81 80 - 100 fL    MCH 28.0 26.0 - 34.0 pg    MCHC 34.7 32.0 - 36.0 g/dL    RDW 15.6 (H) 11.5 - 14.5 %    Platelets 165 150 - 450 x10*3/uL    Neutrophils % 59.8 40.0 - 80.0 %    Immature Granulocytes %, Automated 0.2 0.0 - 0.9 %    Lymphocytes % 32.4 13.0 - 44.0 %    Monocytes % 6.0 2.0 - 10.0 %    Eosinophils % 0.9 0.0 - 6.0 %    Basophils % 0.7 0.0 - 2.0 %    Neutrophils Absolute 5.17 1.20 - 7.70 x10*3/uL    Immature Granulocytes Absolute, Automated 0.02 0.00 - 0.70 x10*3/uL    Lymphocytes Absolute 2.80 1.20 - 4.80 x10*3/uL    Monocytes Absolute 0.52 0.10 - 1.00 x10*3/uL    Eosinophils Absolute 0.08 0.00 - 0.70 x10*3/uL    Basophils Absolute 0.06 0.00 - 0.10 x10*3/uL      I have personally reviewed the following imaging results MR brain w and wo IV contrast    Result Date: 12/27/2023  Interpreted By:  Devon Aceves and Bera Kaustav STUDY: MR BRAIN W AND WO IV CONTRAST; MR ORBIT W AND WO IV CONTRAST; 12/27/2023 12:18 am; 12/27/2023 12:13 am   INDICATION: Signs/Symptoms:optic neuritis.   COMPARISON: None.   ACCESSION NUMBER(S): [id number]; [id number]   ORDERING CLINICIAN: RADHA FRAUSTO   TECHNIQUE: Diffusion, T2, FLAIR, and T1 weighted MR images of the brain were obtained.  High resolution coronal T1 and T2 Dwyer fat-sat images of the orbits were obtained.  Following administration of 17 mL of Dotarem, gadolinium based intravenous contrast, post contrast T1 images of the brain and high resolution fat-suppressed axial and coronal T1 images of the orbits were acquired.   FINDINGS: Images are mildly degraded due to motion artifact.   CSF Spaces: The ventricles, sulci and basal cisterns are within normal limits.   Parenchyma: There is no diffusion restriction abnormality to suggest acute infarct.  There is no focal parenchymal signal abnormality.  No  abnormal parenchymal enhancement is identified.  There is no mass effect or midline shift. There is no intracranial hemorrhage. There are scattered periventricular and subcortical T2 and FLAIR white matter hyperintensities, some of which are oriented perpendicular to the ventricles. No associated abnormal enhancement.   Orbits:There is asymmetric abnormal enhancement of the left optic nerve. The orbits are otherwise normal. The optic chiasm is unremarkable.   Paranasal Sinuses and Mastoids: Visualized paranasal sinuses and mastoid air cells are unremarkable.       1. Scattered periventricular and subcortical nonenhancing T2 and FLAIR white matter hyperintensities within bilateral cerebral hemispheres are nonspecific, with the most common differential possibilities including sequelae of chronic microangiopathy versus inflammatory demyelinating disease. 2. Asymmetric enhancement of the left optic nerve, compatible with optic neuritis. 3. No evidence of acute intracranial abnormality. No other abnormal intracranial enhancement.   I personally reviewed the images/study and I agree with the findings as stated. This study was interpreted at [place], [place].   MACRO: None   Signed by: Devon Aceves 12/27/2023 3:02 AM Dictation workstation:   [id number]    MR orbit w and wo IV contrast    Result Date: 12/27/2023  Interpreted By:  Devon Aceves and Bera Kaustav STUDY: MR BRAIN W AND WO IV CONTRAST; MR ORBIT W AND WO IV CONTRAST; 12/27/2023 12:18 am; 12/27/2023 12:13 am   INDICATION: Signs/Symptoms:optic neuritis.   COMPARISON: None.   ACCESSION NUMBER(S): [id number]; [id number]   ORDERING CLINICIAN: RADHA FRAUSTO   TECHNIQUE: Diffusion, T2, FLAIR, and T1 weighted MR images of the brain were obtained.  High resolution coronal T1 and T2 Dwyer fat-sat images of the orbits were obtained.  Following administration of 17 mL of Dotarem, gadolinium based intravenous contrast,  post contrast T1 images of the brain and high resolution fat-suppressed axial and coronal T1 images of the orbits were acquired.   FINDINGS: Images are mildly degraded due to motion artifact.   CSF Spaces: The ventricles, sulci and basal cisterns are within normal limits.   Parenchyma: There is no diffusion restriction abnormality to suggest acute infarct.  There is no focal parenchymal signal abnormality.  No abnormal parenchymal enhancement is identified.  There is no mass effect or midline shift. There is no intracranial hemorrhage. There are scattered periventricular and subcortical T2 and FLAIR white matter hyperintensities, some of which are oriented perpendicular to the ventricles. No associated abnormal enhancement.   Orbits:There is asymmetric abnormal enhancement of the left optic nerve. The orbits are otherwise normal. The optic chiasm is unremarkable.   Paranasal Sinuses and Mastoids: Visualized paranasal sinuses and mastoid air cells are unremarkable.       1. Scattered periventricular and subcortical nonenhancing T2 and FLAIR white matter hyperintensities within bilateral cerebral hemispheres are nonspecific, with the most common differential possibilities including sequelae of chronic microangiopathy versus inflammatory demyelinating disease. 2. Asymmetric enhancement of the left optic nerve, compatible with optic neuritis. 3. No evidence of acute intracranial abnormality. No other abnormal intracranial enhancement.   I personally reviewed the images/study and I agree with the findings as stated. This study was interpreted at [facility], [place], [place].   MACRO: None   Signed by: Devon Aceves 12/27/2023 3:02 AM Dictation workstation:   [id number]     Assessment/Plan   Active Problems:  There are no active Hospital Problems.  48 year old woman with no significant medical history admitted for management of left optic neuritis in setting of acute vision loss in the left  eye and radiologically isolated white matter signal abnormality on MRI. Isolated optic neuritis vs MS vs other demyelinating disease are on differential.    #optic neuritis  - IV methylprednisolone 1g once daily  - MRI C and T spine w w/o contrast to eval for other lesions.  - Serum CNS demyelinating panel  - Serum B12 and MMA due to distal paresthesias.   - Will need outpatient neuroimmunology and neuro-ophthalmology    #AUD  - High dose IV thiamine  - CIWA scoring    #Tobacco use disorder  - Motivational interviewing to encourage quitting smoking  - Nicotine patch q24h, nicotine gum/lozenges Q2h PRN    F: PRN  E: PRN  N: regular  A: piv     O2: room air  Bowel Regimen: PRN  DVT ppx: Lovenox      NOK: Elian () [phone]   Code Status: presumed full code     Johann Velazquez MD PGY4 psychiatry, staffed with attending neurologist Dr. Bolaños.

## 2023-12-28 ENCOUNTER — APPOINTMENT (OUTPATIENT)
Dept: RADIOLOGY | Facility: HOSPITAL | Age: 48
DRG: 123 | End: 2023-12-28
Payer: COMMERCIAL

## 2023-12-28 LAB
25(OH)D3 SERPL-MCNC: 36 NG/ML (ref 30–100)
VIT B12 SERPL-MCNC: 631 PG/ML (ref 211–911)

## 2023-12-28 PROCEDURE — 2500000001 HC RX 250 WO HCPCS SELF ADMINISTERED DRUGS (ALT 637 FOR MEDICARE OP)

## 2023-12-28 PROCEDURE — 83921 ORGANIC ACID SINGLE QUANT: CPT

## 2023-12-28 PROCEDURE — 99232 SBSQ HOSP IP/OBS MODERATE 35: CPT

## 2023-12-28 PROCEDURE — S4991 NICOTINE PATCH NONLEGEND: HCPCS

## 2023-12-28 PROCEDURE — A9575 INJ GADOTERATE MEGLUMI 0.1ML: HCPCS | Performed by: PSYCHIATRY & NEUROLOGY

## 2023-12-28 PROCEDURE — 72157 MRI CHEST SPINE W/O & W/DYE: CPT | Performed by: RADIOLOGY

## 2023-12-28 PROCEDURE — 72156 MRI NECK SPINE W/O & W/DYE: CPT

## 2023-12-28 PROCEDURE — 82306 VITAMIN D 25 HYDROXY: CPT

## 2023-12-28 PROCEDURE — 96372 THER/PROPH/DIAG INJ SC/IM: CPT | Performed by: STUDENT IN AN ORGANIZED HEALTH CARE EDUCATION/TRAINING PROGRAM

## 2023-12-28 PROCEDURE — 1100000001 HC PRIVATE ROOM DAILY

## 2023-12-28 PROCEDURE — 86038 ANTINUCLEAR ANTIBODIES: CPT

## 2023-12-28 PROCEDURE — 36415 COLL VENOUS BLD VENIPUNCTURE: CPT

## 2023-12-28 PROCEDURE — 72156 MRI NECK SPINE W/O & W/DYE: CPT | Performed by: RADIOLOGY

## 2023-12-28 PROCEDURE — 2500000004 HC RX 250 GENERAL PHARMACY W/ HCPCS (ALT 636 FOR OP/ED)

## 2023-12-28 PROCEDURE — 72157 MRI CHEST SPINE W/O & W/DYE: CPT

## 2023-12-28 PROCEDURE — 86235 NUCLEAR ANTIGEN ANTIBODY: CPT

## 2023-12-28 PROCEDURE — 2500000002 HC RX 250 W HCPCS SELF ADMINISTERED DRUGS (ALT 637 FOR MEDICARE OP, ALT 636 FOR OP/ED)

## 2023-12-28 PROCEDURE — 86256 FLUORESCENT ANTIBODY TITER: CPT

## 2023-12-28 PROCEDURE — 2500000001 HC RX 250 WO HCPCS SELF ADMINISTERED DRUGS (ALT 637 FOR MEDICARE OP): Performed by: STUDENT IN AN ORGANIZED HEALTH CARE EDUCATION/TRAINING PROGRAM

## 2023-12-28 PROCEDURE — 2550000001 HC RX 255 CONTRASTS: Performed by: PSYCHIATRY & NEUROLOGY

## 2023-12-28 PROCEDURE — 82607 VITAMIN B-12: CPT

## 2023-12-28 PROCEDURE — 2500000004 HC RX 250 GENERAL PHARMACY W/ HCPCS (ALT 636 FOR OP/ED): Performed by: STUDENT IN AN ORGANIZED HEALTH CARE EDUCATION/TRAINING PROGRAM

## 2023-12-28 RX ORDER — GADOTERATE MEGLUMINE 376.9 MG/ML
20 INJECTION INTRAVENOUS
Status: COMPLETED | OUTPATIENT
Start: 2023-12-28 | End: 2023-12-28

## 2023-12-28 RX ORDER — POLYETHYLENE GLYCOL 3350 17 G/17G
17 POWDER, FOR SOLUTION ORAL DAILY
Status: DISCONTINUED | OUTPATIENT
Start: 2023-12-28 | End: 2024-01-01 | Stop reason: HOSPADM

## 2023-12-28 RX ORDER — ESOMEPRAZOLE MAGNESIUM 40 MG/1
40 GRANULE, DELAYED RELEASE ORAL
Status: DISCONTINUED | OUTPATIENT
Start: 2023-12-28 | End: 2023-12-30

## 2023-12-28 RX ORDER — PANTOPRAZOLE SODIUM 40 MG/1
40 TABLET, DELAYED RELEASE ORAL
Status: DISCONTINUED | OUTPATIENT
Start: 2023-12-28 | End: 2023-12-30

## 2023-12-28 RX ORDER — CHOLECALCIFEROL (VITAMIN D3) 25 MCG
1000 TABLET ORAL DAILY
Status: DISCONTINUED | OUTPATIENT
Start: 2023-12-28 | End: 2024-01-01 | Stop reason: HOSPADM

## 2023-12-28 RX ORDER — PANTOPRAZOLE SODIUM 40 MG/10ML
40 INJECTION, POWDER, LYOPHILIZED, FOR SOLUTION INTRAVENOUS
Status: DISCONTINUED | OUTPATIENT
Start: 2023-12-28 | End: 2023-12-30

## 2023-12-28 RX ORDER — LANOLIN ALCOHOL/MO/W.PET/CERES
1000 CREAM (GRAM) TOPICAL DAILY
Status: DISCONTINUED | OUTPATIENT
Start: 2023-12-28 | End: 2024-01-01 | Stop reason: HOSPADM

## 2023-12-28 RX ORDER — TALC
3 POWDER (GRAM) TOPICAL DAILY
Status: DISCONTINUED | OUTPATIENT
Start: 2023-12-28 | End: 2024-01-01 | Stop reason: HOSPADM

## 2023-12-28 RX ADMIN — MELATONIN 3 MG: 3 TAB ORAL at 21:34

## 2023-12-28 RX ADMIN — GADOTERATE MEGLUMINE 17 ML: 376.9 INJECTION INTRAVENOUS at 20:40

## 2023-12-28 RX ADMIN — PANTOPRAZOLE SODIUM 40 MG: 40 TABLET, DELAYED RELEASE ORAL at 08:45

## 2023-12-28 RX ADMIN — NICOTINE 1 PATCH: 14 PATCH, EXTENDED RELEASE TRANSDERMAL at 08:42

## 2023-12-28 RX ADMIN — ACETAMINOPHEN 650 MG: 325 TABLET ORAL at 22:39

## 2023-12-28 RX ADMIN — LORAZEPAM 1 MG: 1 TABLET ORAL at 19:06

## 2023-12-28 RX ADMIN — THIAMINE HCL TAB 100 MG 500 MG: 100 TAB at 08:41

## 2023-12-28 RX ADMIN — Medication 1000 UNITS: at 08:41

## 2023-12-28 RX ADMIN — CYANOCOBALAMIN TAB 1000 MCG 1000 MCG: 1000 TAB at 08:41

## 2023-12-28 RX ADMIN — ENOXAPARIN SODIUM 40 MG: 100 INJECTION SUBCUTANEOUS at 17:30

## 2023-12-28 RX ADMIN — METHYLPREDNISOLONE SODIUM SUCCINATE 1000 MG: 1 INJECTION, POWDER, LYOPHILIZED, FOR SOLUTION INTRAMUSCULAR; INTRAVENOUS at 10:16

## 2023-12-28 ASSESSMENT — COGNITIVE AND FUNCTIONAL STATUS - GENERAL: MOBILITY SCORE: 24

## 2023-12-28 ASSESSMENT — PAIN DESCRIPTION - LOCATION: LOCATION: HEAD

## 2023-12-28 ASSESSMENT — PAIN SCALES - GENERAL
PAINLEVEL_OUTOF10: 6
PAINLEVEL_OUTOF10: 0 - NO PAIN
PAINLEVEL_OUTOF10: 0 - NO PAIN

## 2023-12-28 ASSESSMENT — PAIN - FUNCTIONAL ASSESSMENT: PAIN_FUNCTIONAL_ASSESSMENT: 0-10

## 2023-12-28 NOTE — PROGRESS NOTES
"Margarita Suresh is a 48 y.o. female on day 1 of admission presenting with Optic neuritis, left.      Subjective   No acute events overnight. Ms. Suresh notes no changes in vision. No acure complaints.       Objective     Last Recorded Vitals  Blood pressure 129/80, pulse 84, temperature 36.3 °C (97.3 °F), resp. rate 20, height 1.6 m (5' 3\"), weight 86.6 kg (190 lb 14.7 oz), last menstrual period 12/26/2023, SpO2 97 %.    Physical Exam  Constitutional:       Appearance: Normal appearance.   HENT:      Head: Normocephalic and atraumatic.      Mouth/Throat:      Mouth: Mucous membranes are moist.   Eyes:      Extraocular Movements: Extraocular movements intact.      Pupils: Pupils are equal, round, and reactive to light.   Cardiovascular:      Rate and Rhythm: Normal rate and regular rhythm.   Pulmonary:      Effort: Pulmonary effort is normal.      Breath sounds: Normal breath sounds.   Abdominal:      General: Abdomen is flat.      Palpations: Abdomen is soft.      Tenderness: There is no abdominal tenderness.   Skin:     General: Skin is warm and dry.   Neurological:      Motor: Motor strength is normal.  Psychiatric:         Speech: Speech normal.       Neurological Exam  Mental Status  Awake, alert and oriented to person, place and time. Speech is normal. Language is fluent with no aphasia. Attention and concentration are normal.    Cranial Nerves  CN II: Decreased central vision in left eye. Peripheral vision preserved..  CN III, IV, VI: Extraocular movements intact bilaterally. Pupils equal round and reactive to light bilaterally.  CN V: Facial sensation is normal.  CN VII: Full and symmetric facial movement.  CN VIII: Hearing is normal.  CN IX, X: Palate elevates symmetrically  CN XI: Shoulder shrug strength is normal.  CN XII: Tongue midline without atrophy or fasciculations.    Motor  Normal muscle bulk throughout. No fasciculations present. Normal muscle tone. No abnormal involuntary movements. Strength is 5/5 " throughout all four extremities.    Sensory  Light touch is normal in upper and lower extremities.     Coordination  Right: Finger-to-nose normal.Left: Finger-to-nose normal.    Relevant Results  Scheduled medications  cholecalciferol, 1,000 Units, oral, Daily  cyanocobalamin, 1,000 mcg, oral, Daily  enoxaparin, 40 mg, subcutaneous, q24h  pantoprazole, 40 mg, oral, Daily before breakfast   Or  esomeprazole, 40 mg, nasoduodenal tube, Daily before breakfast   Or  pantoprazole, 40 mg, intravenous, Daily before breakfast  melatonin, 3 mg, oral, Daily  methylPREDNISolone sodium succinate (PF), 1,000 mg, intravenous, Every Day  nicotine, 1 patch, transdermal, Daily  polyethylene glycol, 17 g, oral, Daily  thiamine, 500 mg, oral, Daily      Continuous medications     PRN medications  PRN medications: acetaminophen, LORazepam, melatonin, sennosides-docusate sodium    Results for orders placed or performed during the hospital encounter of 12/27/23 (from the past 24 hour(s))   Comprehensive metabolic panel   Result Value Ref Range    Glucose 80 74 - 99 mg/dL    Sodium 140 136 - 145 mmol/L    Potassium 3.9 3.5 - 5.3 mmol/L    Chloride 107 98 - 107 mmol/L    Bicarbonate 23 21 - 32 mmol/L    Anion Gap 14 10 - 20 mmol/L    Urea Nitrogen 5 (L) 6 - 23 mg/dL    Creatinine 0.64 0.50 - 1.05 mg/dL    eGFR >90 >60 mL/min/1.73m*2    Calcium 10.0 8.6 - 10.6 mg/dL    Albumin 4.5 3.4 - 5.0 g/dL    Alkaline Phosphatase 55 33 - 110 U/L    Total Protein 8.2 6.4 - 8.2 g/dL    AST 27 9 - 39 U/L    Bilirubin, Total 0.2 0.0 - 1.2 mg/dL    ALT 20 7 - 45 U/L   CBC and Auto Differential   Result Value Ref Range    WBC 8.7 4.4 - 11.3 x10*3/uL    nRBC 0.0 0.0 - 0.0 /100 WBCs    RBC 4.40 4.00 - 5.20 x10*6/uL    Hemoglobin 12.3 12.0 - 16.0 g/dL    Hematocrit 35.4 (L) 36.0 - 46.0 %    MCV 81 80 - 100 fL    MCH 28.0 26.0 - 34.0 pg    MCHC 34.7 32.0 - 36.0 g/dL    RDW 15.6 (H) 11.5 - 14.5 %    Platelets 165 150 - 450 x10*3/uL    Neutrophils % 59.8 40.0 -  80.0 %    Immature Granulocytes %, Automated 0.2 0.0 - 0.9 %    Lymphocytes % 32.4 13.0 - 44.0 %    Monocytes % 6.0 2.0 - 10.0 %    Eosinophils % 0.9 0.0 - 6.0 %    Basophils % 0.7 0.0 - 2.0 %    Neutrophils Absolute 5.17 1.20 - 7.70 x10*3/uL    Immature Granulocytes Absolute, Automated 0.02 0.00 - 0.70 x10*3/uL    Lymphocytes Absolute 2.80 1.20 - 4.80 x10*3/uL    Monocytes Absolute 0.52 0.10 - 1.00 x10*3/uL    Eosinophils Absolute 0.08 0.00 - 0.70 x10*3/uL    Basophils Absolute 0.06 0.00 - 0.10 x10*3/uL          Collette Coma Scale  Best Eye Response: Spontaneous  Best Verbal Response: Oriented  Best Motor Response: Follows commands  Phelps Coma Scale Score: 15                MR brain w and wo IV contrast    Result Date: 12/27/2023  Interpreted By:  Devon Aceves and Bera Kaustav STUDY: MR BRAIN W AND WO IV CONTRAST; MR ORBIT W AND WO IV CONTRAST; 12/27/2023 12:18 am; 12/27/2023 12:13 am   INDICATION: Signs/Symptoms:optic neuritis.   COMPARISON: None.   ACCESSION NUMBER(S): [id number]; [id number]   ORDERING CLINICIAN: RADHA FRAUSTO   TECHNIQUE: Diffusion, T2, FLAIR, and T1 weighted MR images of the brain were obtained.  High resolution coronal T1 and T2 Dwyer fat-sat images of the orbits were obtained.  Following administration of 17 mL of Dotarem, gadolinium based intravenous contrast, post contrast T1 images of the brain and high resolution fat-suppressed axial and coronal T1 images of the orbits were acquired.   FINDINGS: Images are mildly degraded due to motion artifact.   CSF Spaces: The ventricles, sulci and basal cisterns are within normal limits.   Parenchyma: There is no diffusion restriction abnormality to suggest acute infarct.  There is no focal parenchymal signal abnormality.  No abnormal parenchymal enhancement is identified.  There is no mass effect or midline shift. There is no intracranial hemorrhage. There are scattered periventricular and subcortical T2 and FLAIR white matter  hyperintensities, some of which are oriented perpendicular to the ventricles. No associated abnormal enhancement.   Orbits:There is asymmetric abnormal enhancement of the left optic nerve. The orbits are otherwise normal. The optic chiasm is unremarkable.   Paranasal Sinuses and Mastoids: Visualized paranasal sinuses and mastoid air cells are unremarkable.       1. Scattered periventricular and subcortical nonenhancing T2 and FLAIR white matter hyperintensities within bilateral cerebral hemispheres are nonspecific, with the most common differential possibilities including sequelae of chronic microangiopathy versus inflammatory demyelinating disease. 2. Asymmetric enhancement of the left optic nerve, compatible with optic neuritis. 3. No evidence of acute intracranial abnormality. No other abnormal intracranial enhancement.   I personally reviewed the images/study and I agree with the findings as stated. This study was interpreted at [place], [place].   MACRO: None   Signed by: Devon Aceves 12/27/2023 3:02 AM Dictation workstation:   [id number]    MR orbit w and wo IV contrast    Result Date: 12/27/2023  Interpreted By:  Devon Aceves  and Nati Lagunas STUDY: MR BRAIN W AND WO IV CONTRAST; MR ORBIT W AND WO IV CONTRAST; 12/27/2023 12:18 am; 12/27/2023 12:13 am   INDICATION: Signs/Symptoms:optic neuritis.   COMPARISON: None.   ACCESSION NUMBER(S): [id number]; [id number]   ORDERING CLINICIAN: RADHA FRAUSTO   TECHNIQUE: Diffusion, T2, FLAIR, and T1 weighted MR images of the brain were obtained.  High resolution coronal T1 and T2 Dwyer fat-sat images of the orbits were obtained.  Following administration of 17 mL of Dotarem, gadolinium based intravenous contrast, post contrast T1 images of the brain and high resolution fat-suppressed axial and coronal T1 images of the orbits were acquired.   FINDINGS: Images are mildly degraded due to motion artifact.   CSF Spaces: The  ventricles, sulci and basal cisterns are within normal limits.   Parenchyma: There is no diffusion restriction abnormality to suggest acute infarct.  There is no focal parenchymal signal abnormality.  No abnormal parenchymal enhancement is identified.  There is no mass effect or midline shift. There is no intracranial hemorrhage. There are scattered periventricular and subcortical T2 and FLAIR white matter hyperintensities, some of which are oriented perpendicular to the ventricles. No associated abnormal enhancement.   Orbits:There is asymmetric abnormal enhancement of the left optic nerve. The orbits are otherwise normal. The optic chiasm is unremarkable.   Paranasal Sinuses and Mastoids: Visualized paranasal sinuses and mastoid air cells are unremarkable.       1. Scattered periventricular and subcortical nonenhancing T2 and FLAIR white matter hyperintensities within bilateral cerebral hemispheres are nonspecific, with the most common differential possibilities including sequelae of chronic microangiopathy versus inflammatory demyelinating disease. 2. Asymmetric enhancement of the left optic nerve, compatible with optic neuritis. 3. No evidence of acute intracranial abnormality. No other abnormal intracranial enhancement.   I personally reviewed the images/study and I agree with the findings as stated. This study was interpreted at [facility], [place], [place].   MACRO: None   Signed by: Devon Aceves 12/27/2023 3:02 AM Dictation workstation:   [id number]            Assessment/Plan      Principal Problem:    Optic neuritis, left    48 year old woman with no significant medical history admitted for management of left optic neuritis in setting of acute vision loss in the left eye and enhancement of left optic nerve on MRI and radiologically isolated brain white matter signal abnormalities on MRI. Isolated optic neuritis vs MS vs other demyelinating disease are on differential.      #optic neuritis  - IV methylprednisolone 1g q18h for 3-5 days  - MRI C and T spine w w/o contrast to eval for other lesions.  - Serum CNS demyelinating panel  - Serum B12 and MMA due to distal paresthesias.    :: B12 631  - Vit D level 36, continue oral supplementation  - Will need outpatient neuroimmunology and neuro-ophthalmology follow up     #AUD  - High dose oral thiamine     #Tobacco use disorder  - Motivational interviewing to encourage quitting smoking  - Nicotine patch q24h, nicotine gum/lozenges Q2h PRN     F: PRN  E: PRN  N: regular  A: piv     O2: room air  Bowel Regimen: PRN  DVT ppx: Lovenox      NOK: Elian () [phone]   Code Status: presumed full code      Destin Hebert MD  Neurology PGY-2

## 2023-12-28 NOTE — PROGRESS NOTES
Reason for consult: Vision loss left eye      HPI: 48 year old female no past ocular history presents for 2 weeks of blurred vision left eye. Patient states that she has noticed decreased vision left eye without associated pain, flashes/floaters. Vision started as becoming blurrier and more fuzzy but has developed into poor vision overall with mainly just shadows and movement seen out of that eye. States something similar but less severe happened last year which was dx as an ocular migraine and resolved itself. Patient endorses limb tingling, burning, ?weakness at times over months intermittently without obvious inciting factors. Does say that work has been more stressful in the last weeks before vision changes. No fmaily hx of MS, neurologic conditions.      Past Medical History: as above  Family History: reviewed and not pertinent to chief complaint  Medications: please refer to medication reconciliation  Allergies: please refer to patient allergy list     OCULAR EXAMINATION:  Near visual acuity (VA) right eye 20/20, left eye CF 2'  Pupils: 4>2 both eyes (+) relative afferent pupillary defect (RAPD) left eye   IOP: 12/14  Motility: EOM full both eyes, nonpainful without diplopia   Confrontation visual fields: Full to count fingers right eye, FTHM left eye   Color: 11/11 right eye, unable left eye      ANTERIOR SEGMENT:  OD:  Lids/Lashes: normal anatomy and position  Conjunctiva: white and quiet  Cornea: clear  AC: deep and quiet  Iris: round and reactive  Lens:     OS:  Lids/Lashes: normal anatomy and position  Conjunctiva: white and quiet  Cornea: clear  AC: deep and quiet  Iris: round and reactive  Lens:      DILATED FUNDUS EXAM: (Dilated with 1% tropicamide and 2.5% phenylephrine)     OD:  Cup-to-disc ratio: 0.35  Optic nerve: pink with sharp margins   Vitreous: clear  Macula: flat and attached without lesions  Vessels: normal course and caliber  Periphery: no holes, tears, or detachments     OS:  Cup-to-disc  ratio: 0.35  Optic nerve: pink with sharp margins   Vitreous: clear  Macula: flat and attached without lesions  Vessels: normal course and caliber  Periphery: no holes, tears, or detachments     Imaging:  MRI brain/orbit w and wo contrast, with fat suppression, personally reviewed  -Left optic nerve enhancement, diffuse bilateral cerebral hyperintesities     Update 12/28/23  Patient admitted to neurology, has not yet started steroid treatment this morning but plan to start later today. Denies any worsening symptoms or changes to vision.  Visual acuity (VA) right eye 20/20, left eye CF 2', Pupil 4>2 right eye 4 and minimally reactive left eye, + relative afferent pupillary defect (RAPD) left eye, EOM full and nonpainful, CVF FTCF right eye and left eye full to hand motion temporally, constricted nasally to hand motion. Intraocular pressure (IOP) 15/18. Color 11/11 right eye, unable left eye.   SLE wnl both eyes      Assessment:  This is a 48 year old female no past ocular history presents with decreased vision left eye to CF, new relative afferent pupillary defect (RAPD) concerning for optic neuritis left eye per outside providers. Anterior segment examination wnl both eyes, dilated fundus exam without optic nerve pathology. MRI orbit/brain w/wo contrast with fat suppression shows left optic nerve enhancement, diffuse bilateral cerebral hyperintensities. This presentation and workup is concerning for retrobulbar optic neuritis left eye along with possible demyelinating process systemically. Recommend further workup with neurology consultation and probable admission with IV steroids.      Recommendations:  #Optic neuritis left eye   - MRI showing left optic nerve enhancement concerning for optic neuritis  - Examination concerning considering poor visual acuity (VA) and relative afferent pupillary defect (RAPD) left eye   - Patient admitted to neurology for IV steroids and further workup/imaging  - Labs and imaging per  neurology, CNS demyelinating panel, antinuclear antibodies test (TRENTON), B12, vitamin D - all pending  - MRI spine pending   - Ophthalmology will continue to follow patient while admitted         Thank you for the consult. Please contact the Ophthalmology service with further questions or concerns.     Juan Alberto Serra MD   Ophthalmology PGY2     Ophthalmology Adult Pager - [phone]  Ophthalmology Pediatrics Pager - [phone]     For adult follow-up appointments, call: [phone]  For pediatric follow-up appointments, call: [phone]

## 2023-12-28 NOTE — PROGRESS NOTES
Care Transitions Progress Note:  Plan per medical team: optic neuritis workup  Team Members Present: NP: MD: SULEMA: WILL  Status: inpatient  Payor: javier  Barriers: none  Adod: 3 days

## 2023-12-28 NOTE — CARE PLAN
The patient's goals for the shift include  getting sleep    The clinical goals for the shift include Patient will have stable vital signs overnight.    Over the shift, the patient had a stable exam and VS and was able to sleep.

## 2023-12-29 LAB
ANA PATTERN: ABNORMAL
ANA SER QL HEP2 SUBST: POSITIVE
ANA TITR SER IF: ABNORMAL {TITER}
ANION GAP SERPL CALC-SCNC: 16 MMOL/L (ref 10–20)
BASOPHILS # BLD AUTO: 0.05 X10*3/UL (ref 0–0.1)
BASOPHILS NFR BLD AUTO: 0.2 %
BUN SERPL-MCNC: 10 MG/DL (ref 6–23)
CALCIUM SERPL-MCNC: 9.7 MG/DL (ref 8.6–10.6)
CENTROMERE B AB SER-ACNC: <0.2 AI
CHLORIDE SERPL-SCNC: 105 MMOL/L (ref 98–107)
CHROMATIN AB SERPL-ACNC: <0.2 AI
CO2 SERPL-SCNC: 24 MMOL/L (ref 21–32)
CREAT SERPL-MCNC: 0.63 MG/DL (ref 0.5–1.05)
DSDNA AB SER-ACNC: <1 IU/ML
ENA JO1 AB SER QL IA: <0.2 AI
ENA RNP AB SER IA-ACNC: <0.2 AI
ENA SCL70 AB SER QL IA: <0.2 AI
ENA SM AB SER IA-ACNC: <0.2 AI
ENA SM+RNP AB SER QL IA: <0.2 AI
ENA SS-A AB SER IA-ACNC: <0.2 AI
ENA SS-B AB SER IA-ACNC: <0.2 AI
EOSINOPHIL # BLD AUTO: 0 X10*3/UL (ref 0–0.7)
EOSINOPHIL NFR BLD AUTO: 0 %
ERYTHROCYTE [DISTWIDTH] IN BLOOD BY AUTOMATED COUNT: 17.1 % (ref 11.5–14.5)
GFR SERPL CREATININE-BSD FRML MDRD: >90 ML/MIN/1.73M*2
GLUCOSE SERPL-MCNC: 169 MG/DL (ref 74–99)
HCT VFR BLD AUTO: 38.2 % (ref 36–46)
HGB BLD-MCNC: 11.9 G/DL (ref 12–16)
IMM GRANULOCYTES # BLD AUTO: 0.3 X10*3/UL (ref 0–0.7)
IMM GRANULOCYTES NFR BLD AUTO: 1 % (ref 0–0.9)
LYMPHOCYTES # BLD AUTO: 0.83 X10*3/UL (ref 1.2–4.8)
LYMPHOCYTES NFR BLD AUTO: 2.9 %
MAGNESIUM SERPL-MCNC: 1.88 MG/DL (ref 1.6–2.4)
MCH RBC QN AUTO: 27.7 PG (ref 26–34)
MCHC RBC AUTO-ENTMCNC: 31.2 G/DL (ref 32–36)
MCV RBC AUTO: 89 FL (ref 80–100)
MONOCYTES # BLD AUTO: 0.13 X10*3/UL (ref 0.1–1)
MONOCYTES NFR BLD AUTO: 0.5 %
NEUTROPHILS # BLD AUTO: 27.31 X10*3/UL (ref 1.2–7.7)
NEUTROPHILS NFR BLD AUTO: 95.4 %
NRBC BLD-RTO: 0 /100 WBCS (ref 0–0)
PLATELET # BLD AUTO: 478 X10*3/UL (ref 150–450)
POTASSIUM SERPL-SCNC: 3.8 MMOL/L (ref 3.5–5.3)
RBC # BLD AUTO: 4.3 X10*6/UL (ref 4–5.2)
RIBOSOMAL P AB SER-ACNC: <0.2 AI
SODIUM SERPL-SCNC: 141 MMOL/L (ref 136–145)
WBC # BLD AUTO: 28.6 X10*3/UL (ref 4.4–11.3)

## 2023-12-29 PROCEDURE — 83735 ASSAY OF MAGNESIUM: CPT

## 2023-12-29 PROCEDURE — 99232 SBSQ HOSP IP/OBS MODERATE 35: CPT | Performed by: STUDENT IN AN ORGANIZED HEALTH CARE EDUCATION/TRAINING PROGRAM

## 2023-12-29 PROCEDURE — 2500000001 HC RX 250 WO HCPCS SELF ADMINISTERED DRUGS (ALT 637 FOR MEDICARE OP)

## 2023-12-29 PROCEDURE — 94760 N-INVAS EAR/PLS OXIMETRY 1: CPT

## 2023-12-29 PROCEDURE — 85025 COMPLETE CBC W/AUTO DIFF WBC: CPT

## 2023-12-29 PROCEDURE — 2500000004 HC RX 250 GENERAL PHARMACY W/ HCPCS (ALT 636 FOR OP/ED): Performed by: STUDENT IN AN ORGANIZED HEALTH CARE EDUCATION/TRAINING PROGRAM

## 2023-12-29 PROCEDURE — 2500000004 HC RX 250 GENERAL PHARMACY W/ HCPCS (ALT 636 FOR OP/ED)

## 2023-12-29 PROCEDURE — S4991 NICOTINE PATCH NONLEGEND: HCPCS

## 2023-12-29 PROCEDURE — 36415 COLL VENOUS BLD VENIPUNCTURE: CPT

## 2023-12-29 PROCEDURE — 2500000001 HC RX 250 WO HCPCS SELF ADMINISTERED DRUGS (ALT 637 FOR MEDICARE OP): Performed by: STUDENT IN AN ORGANIZED HEALTH CARE EDUCATION/TRAINING PROGRAM

## 2023-12-29 PROCEDURE — 2500000002 HC RX 250 W HCPCS SELF ADMINISTERED DRUGS (ALT 637 FOR MEDICARE OP, ALT 636 FOR OP/ED)

## 2023-12-29 PROCEDURE — 80048 BASIC METABOLIC PNL TOTAL CA: CPT

## 2023-12-29 PROCEDURE — 1100000001 HC PRIVATE ROOM DAILY

## 2023-12-29 RX ORDER — IBUPROFEN 200 MG
1 TABLET ORAL DAILY
Qty: 28 PATCH | Refills: 0
Start: 2023-12-30 | End: 2023-12-31 | Stop reason: SDUPTHER

## 2023-12-29 RX ORDER — KETOROLAC TROMETHAMINE 30 MG/ML
30 INJECTION, SOLUTION INTRAMUSCULAR; INTRAVENOUS ONCE
Status: COMPLETED | OUTPATIENT
Start: 2023-12-29 | End: 2023-12-29

## 2023-12-29 RX ORDER — PANTOPRAZOLE SODIUM 40 MG/1
40 TABLET, DELAYED RELEASE ORAL ONCE
Status: COMPLETED | OUTPATIENT
Start: 2023-12-29 | End: 2023-12-29

## 2023-12-29 RX ADMIN — KETOROLAC TROMETHAMINE 30 MG: 30 INJECTION, SOLUTION INTRAMUSCULAR; INTRAVENOUS at 18:58

## 2023-12-29 RX ADMIN — ACETAMINOPHEN 650 MG: 325 TABLET ORAL at 14:51

## 2023-12-29 RX ADMIN — PANTOPRAZOLE SODIUM 40 MG: 40 TABLET, DELAYED RELEASE ORAL at 06:24

## 2023-12-29 RX ADMIN — NICOTINE 1 PATCH: 14 PATCH, EXTENDED RELEASE TRANSDERMAL at 08:57

## 2023-12-29 RX ADMIN — METHYLPREDNISOLONE SODIUM SUCCINATE 1000 MG: 1 INJECTION, POWDER, LYOPHILIZED, FOR SOLUTION INTRAMUSCULAR; INTRAVENOUS at 22:40

## 2023-12-29 RX ADMIN — ACETAMINOPHEN 650 MG: 325 TABLET ORAL at 04:16

## 2023-12-29 RX ADMIN — Medication 5 MG: at 23:32

## 2023-12-29 RX ADMIN — METHYLPREDNISOLONE SODIUM SUCCINATE 1000 MG: 1 INJECTION, POWDER, LYOPHILIZED, FOR SOLUTION INTRAMUSCULAR; INTRAVENOUS at 04:32

## 2023-12-29 RX ADMIN — MELATONIN 3 MG: 3 TAB ORAL at 20:17

## 2023-12-29 RX ADMIN — PANTOPRAZOLE SODIUM 40 MG: 40 TABLET, DELAYED RELEASE ORAL at 23:30

## 2023-12-29 RX ADMIN — Medication 1000 UNITS: at 08:49

## 2023-12-29 RX ADMIN — ACETAMINOPHEN 650 MG: 325 TABLET ORAL at 08:58

## 2023-12-29 RX ADMIN — ACETAMINOPHEN 650 MG: 325 TABLET ORAL at 22:56

## 2023-12-29 RX ADMIN — CYANOCOBALAMIN TAB 1000 MCG 1000 MCG: 1000 TAB at 08:51

## 2023-12-29 RX ADMIN — THIAMINE HCL TAB 100 MG 500 MG: 100 TAB at 08:50

## 2023-12-29 ASSESSMENT — PAIN DESCRIPTION - LOCATION
LOCATION: HEAD

## 2023-12-29 ASSESSMENT — PAIN - FUNCTIONAL ASSESSMENT
PAIN_FUNCTIONAL_ASSESSMENT: 0-10

## 2023-12-29 ASSESSMENT — PAIN SCALES - GENERAL
PAINLEVEL_OUTOF10: 7
PAINLEVEL_OUTOF10: 7
PAINLEVEL_OUTOF10: 3
PAINLEVEL_OUTOF10: 0 - NO PAIN
PAINLEVEL_OUTOF10: 7
PAINLEVEL_OUTOF10: 3

## 2023-12-29 NOTE — PROGRESS NOTES
"Margarita Suresh is a 48 y.o. female on day 2 of admission presenting with Optic neuritis, left.    Subjective   No acute events overnight. Ms. Suresh notes mild subjective improvements in vision. Has had headache since last night, currently 7/10. Reports she got headache after receiving contrast from brain MRI a few days back as well. Also reports \"feeling puffy\" in the hands. Otherwise no SOB, edema. Good PO and UOP. She has been up ambulating.      Objective     Last Recorded Vitals  Blood pressure 129/81, pulse 97, temperature 36.1 °C (97 °F), temperature source Temporal, resp. rate 16, height 1.6 m (5' 3\"), weight 86.6 kg (190 lb 14.7 oz), last menstrual period 12/26/2023, SpO2 95 %.    Physical Exam  Constitutional:       Appearance: Normal appearance.   HENT:      Head: Normocephalic and atraumatic.      Mouth/Throat:      Mouth: Mucous membranes are moist.   Eyes:      Extraocular Movements: Extraocular movements intact.      Pupils: Pupils are equal, round, and reactive to light.   Cardiovascular:      Rate and Rhythm: Normal rate and regular rhythm.   Pulmonary:      Effort: Pulmonary effort is normal.      Breath sounds: Normal breath sounds.   Abdominal:      General: Abdomen is flat.      Palpations: Abdomen is soft.      Tenderness: There is no abdominal tenderness.   Skin:     General: Skin is warm and dry.   Neurological:      Motor: Motor strength is normal.  Psychiatric:         Speech: Speech normal.       Neurological Exam  Mental Status  Awake, alert and oriented to person, place and time. Speech is normal. Language is fluent with no aphasia. Attention and concentration are normal.    Cranial Nerves  CN II: Decreased central vision in left eye. Peripheral vision preserved..  CN III, IV, VI: Extraocular movements intact bilaterally. Pupils equal round and reactive to light bilaterally.  CN V: Facial sensation is normal.  CN VII: Full and symmetric facial movement.  CN VIII: Hearing is normal.  CN IX, " X: Palate elevates symmetrically  CN XI: Shoulder shrug strength is normal.  CN XII: Tongue midline without atrophy or fasciculations.    Motor  Normal muscle bulk throughout. No fasciculations present. Normal muscle tone. No abnormal involuntary movements. Strength is 5/5 throughout all four extremities.    Sensory  Light touch is normal in upper and lower extremities.     Coordination  Right: Finger-to-nose normal.Left: Finger-to-nose normal.    Relevant Results  Scheduled medications  cholecalciferol, 1,000 Units, oral, Daily  cyanocobalamin, 1,000 mcg, oral, Daily  enoxaparin, 40 mg, subcutaneous, q24h  pantoprazole, 40 mg, oral, Daily before breakfast   Or  esomeprazole, 40 mg, nasoduodenal tube, Daily before breakfast   Or  pantoprazole, 40 mg, intravenous, Daily before breakfast  melatonin, 3 mg, oral, Daily  methylPREDNISolone sodium succinate (PF), 1,000 mg, intravenous, q18h  nicotine, 1 patch, transdermal, Daily  polyethylene glycol, 17 g, oral, Daily    Continuous medications     PRN medications  PRN medications: acetaminophen, melatonin, sennosides-docusate sodium    No results found for this or any previous visit (from the past 24 hour(s)).       Ridott Coma Scale  Best Eye Response: Spontaneous  Best Verbal Response: Oriented  Best Motor Response: Follows commands  Collette Coma Scale Score: 15                MR brain w and wo IV contrast    Result Date: 12/27/2023  Interpreted By:  Devon Aceves and Bera Kaustav STUDY: MR BRAIN W AND WO IV CONTRAST; MR ORBIT W AND WO IV CONTRAST; 12/27/2023 12:18 am; 12/27/2023 12:13 am   INDICATION: Signs/Symptoms:optic neuritis.   COMPARISON: None.   ACCESSION NUMBER(S): [id number]; [id number]   ORDERING CLINICIAN: RADHA FRAUSTO   TECHNIQUE: Diffusion, T2, FLAIR, and T1 weighted MR images of the brain were obtained.  High resolution coronal T1 and T2 Dwyer fat-sat images of the orbits were obtained.  Following administration of 17 mL of Dotarem, gadolinium  based intravenous contrast, post contrast T1 images of the brain and high resolution fat-suppressed axial and coronal T1 images of the orbits were acquired.   FINDINGS: Images are mildly degraded due to motion artifact.   CSF Spaces: The ventricles, sulci and basal cisterns are within normal limits.   Parenchyma: There is no diffusion restriction abnormality to suggest acute infarct.  There is no focal parenchymal signal abnormality.  No abnormal parenchymal enhancement is identified.  There is no mass effect or midline shift. There is no intracranial hemorrhage. There are scattered periventricular and subcortical T2 and FLAIR white matter hyperintensities, some of which are oriented perpendicular to the ventricles. No associated abnormal enhancement.   Orbits:There is asymmetric abnormal enhancement of the left optic nerve. The orbits are otherwise normal. The optic chiasm is unremarkable.   Paranasal Sinuses and Mastoids: Visualized paranasal sinuses and mastoid air cells are unremarkable.       1. Scattered periventricular and subcortical nonenhancing T2 and FLAIR white matter hyperintensities within bilateral cerebral hemispheres are nonspecific, with the most common differential possibilities including sequelae of chronic microangiopathy versus inflammatory demyelinating disease. 2. Asymmetric enhancement of the left optic nerve, compatible with optic neuritis. 3. No evidence of acute intracranial abnormality. No other abnormal intracranial enhancement.   I personally reviewed the images/study and I agree with the findings as stated. This study was interpreted at [place], [place].   MACRO: None   Signed by: Devon Aceves 12/27/2023 3:02 AM Dictation workstation:   [id number]    MR orbit w and wo IV contrast    Result Date: 12/27/2023  Interpreted By:  Devon Aceves,  and Nati Lagunas STUDY: MR BRAIN W AND WO IV CONTRAST; MR ORBIT W AND WO IV CONTRAST; 12/27/2023  12:18 am; 12/27/2023 12:13 am   INDICATION: Signs/Symptoms:optic neuritis.   COMPARISON: None.   ACCESSION NUMBER(S): [id number]; [id number]   ORDERING CLINICIAN: RADHA FRAUSTO   TECHNIQUE: Diffusion, T2, FLAIR, and T1 weighted MR images of the brain were obtained.  High resolution coronal T1 and T2 Dwyer fat-sat images of the orbits were obtained.  Following administration of 17 mL of Dotarem, gadolinium based intravenous contrast, post contrast T1 images of the brain and high resolution fat-suppressed axial and coronal T1 images of the orbits were acquired.   FINDINGS: Images are mildly degraded due to motion artifact.   CSF Spaces: The ventricles, sulci and basal cisterns are within normal limits.   Parenchyma: There is no diffusion restriction abnormality to suggest acute infarct.  There is no focal parenchymal signal abnormality.  No abnormal parenchymal enhancement is identified.  There is no mass effect or midline shift. There is no intracranial hemorrhage. There are scattered periventricular and subcortical T2 and FLAIR white matter hyperintensities, some of which are oriented perpendicular to the ventricles. No associated abnormal enhancement.   Orbits:There is asymmetric abnormal enhancement of the left optic nerve. The orbits are otherwise normal. The optic chiasm is unremarkable.   Paranasal Sinuses and Mastoids: Visualized paranasal sinuses and mastoid air cells are unremarkable.       1. Scattered periventricular and subcortical nonenhancing T2 and FLAIR white matter hyperintensities within bilateral cerebral hemispheres are nonspecific, with the most common differential possibilities including sequelae of chronic microangiopathy versus inflammatory demyelinating disease. 2. Asymmetric enhancement of the left optic nerve, compatible with optic neuritis. 3. No evidence of acute intracranial abnormality. No other abnormal intracranial enhancement.   I personally reviewed the images/study and I  agree with the findings as stated. This study was interpreted at [facility], [place], [place].   MACRO: None   Signed by: Devon Aceves 12/27/2023 3:02 AM Dictation workstation:   [id number]   Interpreted By:  David Hull and Bera Kaustav     STUDY:  MR CERVICAL SPINE W AND WO IV CONTRAST; MR THORACIC SPINE W AND WO IV  CONTRAST;  12/28/2023 9:05 pm      INDICATION:  Signs/Symptoms:Optic neuritis, rule out MS.      COMPARISON:  None.      ACCESSION NUMBER(S):  [id number]; [id number]      ORDERING CLINICIAN:  LUCILA COVINGTON      IMPRESSION:  1. No evidence of signal abnormality or abnormal enhancement within  the cervical and thoracic spinal cord.  2. Minimal spinal canal stenosis at C4-5 and C5-6. Otherwise, no  severe spinal canal or neural foraminal stenosis.     Assessment/Plan      Principal Problem:    Optic neuritis, left    48 year old woman with no significant medical history admitted for management of left optic neuritis in setting of acute vision loss in the left eye and enhancement of left optic nerve on MRI and radiologically isolated brain white matter signal abnormalities on MRI. Isolated optic neuritis vs MS vs other demyelinating disease are on differential. Reassuring that no lesions were found on C and T spine MRI. Will treat for acute process as below. Joint decision making done with patient to defer LP at this point as it wouldn't change current management as patient will receive close monitoring outpatient by neuroimmunology team.      #optic neuritis  - IV methylprednisolone 1g q18h for 3-5 days  - s/p MRI C and T spine w w/o contrast to eval for other lesions.  - awaiting results of serum CNS demyelinating panel  - Serum B12 and MMA checked due to distal paresthesias. Levels WNL    :: B12 631  - Vit D level 36, continue oral supplementation  - Will need outpatient neuroimmunology and neuro-ophthalmology follow up     #AUD  - High dose oral thiamine      #Tobacco use disorder  - Motivational interviewing to encourage quitting smoking  - Nicotine patch q24h     F: PRN  E: PRN  N: regular  A: piv     O2: room air  Bowel Regimen: PRN  DVT ppx: Lovenox      NOK: Elian () [phone]   Code Status: presumed full code      Johann Velazquez MD, Psychiatry PGY-4. Staffed with attending neurologist Dr. Bolaños.

## 2023-12-29 NOTE — HOSPITAL COURSE
"49 y/o woman with no significant chronic medical history who presented to  ED on 12/26 at suggestion of outpatient neurology due to concern for optic neuritis in the setting of acute onset central field vision loss in the left eye. CBC, CMP and coags in ED unremarkable, brain MRI confirming findings compatible with optic neuritis (left) and non-specific white matter hyperintensities within bilateral cerebral hemispheres. Ophthalmology evaluated in the ED with impression of new RAPD concerning for retrobulbar optic neuritis of left eye. Due to these findings decision was made to admit patient to the neurology service for further work up and management. She was started on high-dose IV methylprednisolone q18hr from 12/28- 12/31 for total of 5 doses. MRI of C- and T-spine was obtained to evaluate for spinal lesions, but was read as \"...No evidence of signal abnormality or abnormal enhancement within the cervical and thoracic spinal cord.\" Serum B12, MMA, and Vit D levels were obtained which were WNL. TRENTON was positive, but reflex PRAKASH was negative. Referred to outpatient rheumatology for follow up of TRENTON. Serum CNS demyelinating panel pending as of time of discharge. Patient tolerated the course of high dose steroids without significant side effects, remained hemodynamically stable and on room air throughout hospitalization. Did have headache during hospitalization that responded to ketorolac, metoclopramide, magnesium, and IV fluids. By time of discharge, she was able to make out shapes and count fingers in her left eye. She was discharged on a prednisone taper, with neuro-ophthalmology and neuro-immunology follow up. She was also counseled on smoking cessation and prescribed nicotine patches. She was discharged home 12/31.  "

## 2023-12-29 NOTE — PROGRESS NOTES
Reason for consult: Vision loss left eye      HPI: 48 year old female no past ocular history presents for 2 weeks of blurred vision left eye. Patient states that she has noticed decreased vision left eye without associated pain, flashes/floaters. Vision started as becoming blurrier and more fuzzy but has developed into poor vision overall with mainly just shadows and movement seen out of that eye. States something similar but less severe happened last year which was dx as an ocular migraine and resolved itself. Patient endorses limb tingling, burning, ?weakness at times over months intermittently without obvious inciting factors. Does say that work has been more stressful in the last weeks before vision changes. No fmaily hx of MS, neurologic conditions.      Past Medical History: as above  Family History: reviewed and not pertinent to chief complaint  Medications: please refer to medication reconciliation  Allergies: please refer to patient allergy list     OCULAR EXAMINATION:  Near visual acuity (VA) right eye 20/20, left eye CF 2'  Pupils: 4>2 both eyes (+) relative afferent pupillary defect (RAPD) left eye   IOP: 12/14  Motility: EOM full both eyes, nonpainful without diplopia   Confrontation visual fields: Full to count fingers right eye, FTHM left eye   Color: 11/11 right eye, unable left eye      ANTERIOR SEGMENT:  OD:  Lids/Lashes: normal anatomy and position  Conjunctiva: white and quiet  Cornea: clear  AC: deep and quiet  Iris: round and reactive  Lens:     OS:  Lids/Lashes: normal anatomy and position  Conjunctiva: white and quiet  Cornea: clear  AC: deep and quiet  Iris: round and reactive  Lens:      DILATED FUNDUS EXAM: (Dilated with 1% tropicamide and 2.5% phenylephrine)     OD:  Cup-to-disc ratio: 0.35  Optic nerve: pink with sharp margins   Vitreous: clear  Macula: flat and attached without lesions  Vessels: normal course and caliber  Periphery: no holes, tears, or detachments     OS:  Cup-to-disc  ratio: 0.35  Optic nerve: pink with sharp margins   Vitreous: clear  Macula: flat and attached without lesions  Vessels: normal course and caliber  Periphery: no holes, tears, or detachments     Imaging:  MRI brain/orbit w and wo contrast, with fat suppression, personally reviewed  -Left optic nerve enhancement, diffuse bilateral cerebral hyperintesities     Update 12/28/23  Patient admitted to neurology, has not yet started steroid treatment this morning but plan to start later today. Denies any worsening symptoms or changes to vision.  Visual acuity (VA) right eye 20/20, left eye CF 2', Pupil 4>2 right eye 4 and minimally reactive left eye, + relative afferent pupillary defect (RAPD) left eye, EOM full and nonpainful, CVF FTCF right eye and left eye full to hand motion temporally, constricted nasally to hand motion. Intraocular pressure (IOP) 15/18. Color 11/11 right eye, unable left eye.   SLE wnl both eyes     Update 12/29/23  Patient admitted to neurology, started IV steroid yesterday and patient states that she notices more light and color out of the left eye. Feels as though it is less hindering to binocular vision compared to yesterday.  Visual acuity (VA) right eye 20/20, left eye CF 2', Pupil 4>2 both eyes no relative afferent pupillary defect (RAPD), EOM full and nonpainful, CVF FTCF right eye and left eye FTHM. Intraocular pressure (IOP) 20/19. Color 11/11 right eye, unable left eye.   SLE wnl both eyes      Assessment:  This is a 48 year old female no past ocular history presents with decreased vision left eye to CF, new relative afferent pupillary defect (RAPD) concerning for optic neuritis left eye per outside providers. Anterior segment examination wnl both eyes, dilated fundus exam without optic nerve pathology. MRI orbit/brain w/wo contrast with fat suppression shows left optic nerve enhancement, diffuse bilateral cerebral hyperintensities. This presentation and workup is concerning for retrobulbar  optic neuritis left eye along with possible demyelinating process systemically. Recommend further workup with neurology consultation and probable admission with IV steroids.      Recommendations:  #Optic neuritis left eye   - MRI showing left optic nerve enhancement concerning for optic neuritis  - Examination concerning considering poor visual acuity (VA) and relative afferent pupillary defect (RAPD) left eye   - Patient admitted to neurology for IV steroids and further workup/imaging  - Labs and imaging per neurology, CNS demyelinating panel, antinuclear antibodies test (TR[place]ON) - all pending  - B12 631, vitamin D 36 (being supplemented)  - MRI C/T spine no abnormal enhancement   - Ophthalmology will continue to follow patient while admitted         Thank you for the consult. Please contact the Ophthalmology service with further questions or concerns.     Juan Alberto Serra MD   Ophthalmology PGY2     Ophthalmology Adult Pager - [phone]  Ophthalmology Pediatrics Pager - [phone]     For adult follow-up appointments, call: [phone]  For pediatric follow-up appointments, call: [phone]

## 2023-12-29 NOTE — CARE PLAN
The patient's goals for the shift include      The clinical goals for the shift include Patient will have no worsening of vision overnight.    Over the shift, the patient remained stable and started on methylprednisolone. Pt to mri at end of shift

## 2023-12-30 PROCEDURE — 2500000004 HC RX 250 GENERAL PHARMACY W/ HCPCS (ALT 636 FOR OP/ED)

## 2023-12-30 PROCEDURE — S4991 NICOTINE PATCH NONLEGEND: HCPCS

## 2023-12-30 PROCEDURE — 1100000001 HC PRIVATE ROOM DAILY

## 2023-12-30 PROCEDURE — 2500000002 HC RX 250 W HCPCS SELF ADMINISTERED DRUGS (ALT 637 FOR MEDICARE OP, ALT 636 FOR OP/ED)

## 2023-12-30 PROCEDURE — 94760 N-INVAS EAR/PLS OXIMETRY 1: CPT

## 2023-12-30 PROCEDURE — 99232 SBSQ HOSP IP/OBS MODERATE 35: CPT

## 2023-12-30 PROCEDURE — 2500000004 HC RX 250 GENERAL PHARMACY W/ HCPCS (ALT 636 FOR OP/ED): Performed by: STUDENT IN AN ORGANIZED HEALTH CARE EDUCATION/TRAINING PROGRAM

## 2023-12-30 PROCEDURE — 2500000001 HC RX 250 WO HCPCS SELF ADMINISTERED DRUGS (ALT 637 FOR MEDICARE OP)

## 2023-12-30 RX ORDER — PANTOPRAZOLE SODIUM 40 MG/10ML
40 INJECTION, POWDER, LYOPHILIZED, FOR SOLUTION INTRAVENOUS
Status: DISCONTINUED | OUTPATIENT
Start: 2023-12-30 | End: 2024-01-01 | Stop reason: HOSPADM

## 2023-12-30 RX ORDER — KETOROLAC TROMETHAMINE 30 MG/ML
30 INJECTION, SOLUTION INTRAMUSCULAR; INTRAVENOUS ONCE
Status: COMPLETED | OUTPATIENT
Start: 2023-12-30 | End: 2023-12-30

## 2023-12-30 RX ORDER — PANTOPRAZOLE SODIUM 40 MG/1
40 TABLET, DELAYED RELEASE ORAL
Status: DISCONTINUED | OUTPATIENT
Start: 2023-12-30 | End: 2024-01-01 | Stop reason: HOSPADM

## 2023-12-30 RX ORDER — ESOMEPRAZOLE MAGNESIUM 40 MG/1
40 GRANULE, DELAYED RELEASE ORAL
Status: DISCONTINUED | OUTPATIENT
Start: 2023-12-30 | End: 2024-01-01 | Stop reason: HOSPADM

## 2023-12-30 RX ADMIN — Medication 5 MG: at 20:06

## 2023-12-30 RX ADMIN — PANTOPRAZOLE SODIUM 40 MG: 40 TABLET, DELAYED RELEASE ORAL at 08:53

## 2023-12-30 RX ADMIN — NICOTINE 1 PATCH: 14 PATCH, EXTENDED RELEASE TRANSDERMAL at 08:54

## 2023-12-30 RX ADMIN — METHYLPREDNISOLONE SODIUM SUCCINATE 1000 MG: 1 INJECTION, POWDER, LYOPHILIZED, FOR SOLUTION INTRAMUSCULAR; INTRAVENOUS at 18:37

## 2023-12-30 RX ADMIN — ACETAMINOPHEN, CAFFEINE 2 TABLET: 500; 65 TABLET, FILM COATED ORAL at 21:04

## 2023-12-30 RX ADMIN — ACETAMINOPHEN 650 MG: 325 TABLET ORAL at 06:37

## 2023-12-30 RX ADMIN — Medication 1000 UNITS: at 08:53

## 2023-12-30 RX ADMIN — ACETAMINOPHEN, CAFFEINE 2 TABLET: 500; 65 TABLET, FILM COATED ORAL at 12:08

## 2023-12-30 RX ADMIN — CYANOCOBALAMIN TAB 1000 MCG 1000 MCG: 1000 TAB at 08:53

## 2023-12-30 RX ADMIN — KETOROLAC TROMETHAMINE 30 MG: 30 INJECTION, SOLUTION INTRAMUSCULAR; INTRAVENOUS at 01:31

## 2023-12-30 ASSESSMENT — PAIN SCALES - GENERAL
PAINLEVEL_OUTOF10: 9
PAINLEVEL_OUTOF10: 0 - NO PAIN
PAINLEVEL_OUTOF10: 3
PAINLEVEL_OUTOF10: 7
PAINLEVEL_OUTOF10: 7
PAINLEVEL_OUTOF10: 0 - NO PAIN
PAINLEVEL_OUTOF10: 7

## 2023-12-30 ASSESSMENT — PAIN - FUNCTIONAL ASSESSMENT
PAIN_FUNCTIONAL_ASSESSMENT: 0-10

## 2023-12-30 NOTE — CONSULTS
Reason for consult: Vision loss left eye      HPI: 48 year old female no past ocular history presents for 2 weeks of blurred vision left eye. Patient states that she has noticed decreased vision left eye without associated pain, flashes/floaters. Vision started as becoming blurrier and more fuzzy but has developed into poor vision overall with mainly just shadows and movement seen out of that eye. States something similar but less severe happened last year which was dx as an ocular migraine and resolved itself. Patient endorses limb tingling, burning, ?weakness at times over months intermittently without obvious inciting factors. Does say that work has been more stressful in the last weeks before vision changes. No fmaily hx of MS, neurologic conditions.      Past Medical History: as above  Family History: reviewed and not pertinent to chief complaint  Medications: please refer to medication reconciliation  Allergies: please refer to patient allergy list     OCULAR EXAMINATION:  Near visual acuity (VA) right eye 20/20, left eye CF 2'  Pupils: 4>2 both eyes (+) relative afferent pupillary defect (RAPD) left eye   IOP: 12/14  Motility: EOM full both eyes, nonpainful without diplopia   Confrontation visual fields: Full to count fingers right eye, FTHM left eye   Color: 11/11 right eye, unable left eye      ANTERIOR SEGMENT:  OD:  Lids/Lashes: normal anatomy and position  Conjunctiva: white and quiet  Cornea: clear  AC: deep and quiet  Iris: round and reactive  Lens:     OS:  Lids/Lashes: normal anatomy and position  Conjunctiva: white and quiet  Cornea: clear  AC: deep and quiet  Iris: round and reactive  Lens:      DILATED FUNDUS EXAM: (Dilated with 1% tropicamide and 2.5% phenylephrine)     OD:  Cup-to-disc ratio: 0.35  Optic nerve: pink with sharp margins   Vitreous: clear  Macula: flat and attached without lesions  Vessels: normal course and caliber  Periphery: no holes, tears, or detachments     OS:  Cup-to-disc  ratio: 0.35  Optic nerve: pink with sharp margins   Vitreous: clear  Macula: flat and attached without lesions  Vessels: normal course and caliber  Periphery: no holes, tears, or detachments     Imaging:  MRI brain/orbit w and wo contrast, with fat suppression, personally reviewed  -Left optic nerve enhancement, diffuse bilateral cerebral hyperintesities      Update 12/28/23  Patient admitted to neurology, has not yet started steroid treatment this morning but plan to start later today. Denies any worsening symptoms or changes to vision.  Visual acuity (VA) right eye 20/20, left eye CF 2', Pupil 4>2 right eye 4 and minimally reactive left eye, + relative afferent pupillary defect (RAPD) left eye, EOM full and nonpainful, CVF FTCF right eye and left eye full to hand motion temporally, constricted nasally to hand motion. Intraocular pressure (IOP) 15/18. Color 11/11 right eye, unable left eye.   SLE wnl both eyes      Update 12/29/23  Patient admitted to neurology, started IV steroid yesterday and patient states that she notices more light and color out of the left eye. Feels as though it is less hindering to binocular vision compared to yesterday.  Visual acuity (VA) right eye 20/20, left eye CF 2', Pupil 4>2 both eyes no relative afferent pupillary defect (RAPD), EOM full and nonpainful, CVF FTCF right eye and left eye FTHM. Intraocular pressure (IOP) 20/19. Color 11/11 right eye, unable left eye.   SLE wnl both eyes     Update 12/30/23  Patient currently on day 3 of IVMP. States she feels her vision is improving in the left eye compared to yesterday. No eye pain.  Visual acuity (VA) right eye 20/20, left eye 20/800; Pupil 4>2 both eyes + relative afferent pupillary defect (RAPD) left eye, EOM full and nonpainful, CVF FTCF both eyes. Intraocular pressure (IOP) 18/18. Color 11/11 right eye, unable left eye.   SLE wnl both eyes  DFE wnl both eyes     Assessment:  This is a 48 year old female no past ocular history  presents with decreased vision left eye to CF, new relative afferent pupillary defect (RAPD) concerning for optic neuritis left eye per outside providers. Anterior segment examination wnl both eyes, dilated fundus exam without optic nerve pathology. MRI orbit/brain w/wo contrast with fat suppression shows left optic nerve enhancement, diffuse bilateral cerebral hyperintensities. This presentation and workup is concerning for retrobulbar optic neuritis left eye along with possible demyelinating process systemically. Recommend further workup with neurology consultation and probable admission with IV steroids.      Recommendations:  #Optic neuritis left eye   - MRI showing left optic nerve enhancement concerning for optic neuritis  - Examination concerning considering poor visual acuity (VA) and relative afferent pupillary defect (RAPD) left eye   - Patient admitted to neurology for IV steroids and further workup/imaging  - Labs and imaging per neurology, CNS demyelinating panel, antinuclear antibodies test (TRENTON) - all pending  - B12 631, vitamin D 36 (being supplemented)  - MRI C/T spine no abnormal enhancement   - Tentative plan for discharge per neurology on 12/31/23 - we will arrange outpatient follow up with neuro-ophthalmology within the next 2 weeks          Thank you for the consult. Please contact the Ophthalmology service with further questions or concerns.     Malcolm Miller MD   Ophthalmology PGY2     Ophthalmology Adult Pager - [phone]  Ophthalmology Pediatrics Pager - [phone]     For adult follow-up appointments, call: [phone]  For pediatric follow-up appointments, call: [phone]

## 2023-12-30 NOTE — PROGRESS NOTES
"Margarita Suresh is a 48 y.o. female on day 3 of admission presenting with Optic neuritis, left.    Subjective   Ms. Suresh reports headache overnight not responsive to acetaminophen for which she was given 2x ketorolac. States that at home caffeine and excedrin migraine work for these headaches. Reports improved light perception on left eye. Does also report restlessness, possibly as side effect of steroids. Otherwise no acute changes.      Objective     Last Recorded Vitals  Blood pressure 137/89, pulse 81, temperature 35.9 °C (96.6 °F), resp. rate 18, height 1.6 m (5' 3\"), weight 86.6 kg (190 lb 14.7 oz), last menstrual period 12/26/2023, SpO2 97 %.    Physical Exam  Constitutional:       Appearance: Normal appearance.   HENT:      Head: Normocephalic and atraumatic.      Mouth/Throat:      Mouth: Mucous membranes are moist.   Eyes:      Extraocular Movements: Extraocular movements intact.      Pupils: Pupils are equal, round, and reactive to light.   Cardiovascular:      Rate and Rhythm: Normal rate and regular rhythm.   Pulmonary:      Effort: Pulmonary effort is normal.      Breath sounds: Normal breath sounds.   Abdominal:      General: Abdomen is flat.      Palpations: Abdomen is soft.      Tenderness: There is no abdominal tenderness.   Skin:     General: Skin is warm and dry.   Neurological:      Motor: Motor strength is normal.  Psychiatric:         Speech: Speech normal.       Neurological Exam  Mental Status  Awake, alert and oriented to person, place and time. Speech is normal. Language is fluent with no aphasia. Attention and concentration are normal.    Cranial Nerves  CN II: Decreased central vision in left eye. Peripheral vision preserved..  CN III, IV, VI: Extraocular movements intact bilaterally. Pupils equal round and reactive to light bilaterally.  CN V: Facial sensation is normal.  CN VII: Full and symmetric facial movement.  CN VIII: Hearing is normal.  CN IX, X: Palate elevates symmetrically  CN " XI: Shoulder shrug strength is normal.  CN XII: Tongue midline without atrophy or fasciculations.    Motor  Normal muscle bulk throughout. No fasciculations present. Normal muscle tone. No abnormal involuntary movements. Strength is 5/5 throughout all four extremities.    Sensory  Light touch is normal in upper and lower extremities.     Coordination  Right: Finger-to-nose normal.Left: Finger-to-nose normal.    Relevant Results  Scheduled medications  cholecalciferol, 1,000 Units, oral, Daily  cyanocobalamin, 1,000 mcg, oral, Daily  enoxaparin, 40 mg, subcutaneous, q24h  pantoprazole, 40 mg, oral, Daily before breakfast   Or  esomeprazole, 40 mg, nasoduodenal tube, Daily before breakfast   Or  pantoprazole, 40 mg, intravenous, Daily before breakfast  melatonin, 3 mg, oral, Daily  methylPREDNISolone sodium succinate (PF), 1,000 mg, intravenous, q18h  nicotine, 1 patch, transdermal, Daily  polyethylene glycol, 17 g, oral, Daily    Continuous medications     PRN medications  PRN medications: acetaminophen-caffeine, melatonin, sennosides-docusate sodium    Results for orders placed or performed during the hospital encounter of 12/27/23 (from the past 24 hour(s))   Basic metabolic panel   Result Value Ref Range    Glucose 169 (H) 74 - 99 mg/dL    Sodium 141 136 - 145 mmol/L    Potassium 3.8 3.5 - 5.3 mmol/L    Chloride 105 98 - 107 mmol/L    Bicarbonate 24 21 - 32 mmol/L    Anion Gap 16 10 - 20 mmol/L    Urea Nitrogen 10 6 - 23 mg/dL    Creatinine 0.63 0.50 - 1.05 mg/dL    eGFR >90 >60 mL/min/1.73m*2    Calcium 9.7 8.6 - 10.6 mg/dL   Magnesium   Result Value Ref Range    Magnesium 1.88 1.60 - 2.40 mg/dL   CBC and Auto Differential   Result Value Ref Range    WBC 28.6 (H) 4.4 - 11.3 x10*3/uL    nRBC 0.0 0.0 - 0.0 /100 WBCs    RBC 4.30 4.00 - 5.20 x10*6/uL    Hemoglobin 11.9 (L) 12.0 - 16.0 g/dL    Hematocrit 38.2 36.0 - 46.0 %    MCV 89 80 - 100 fL    MCH 27.7 26.0 - 34.0 pg    MCHC 31.2 (L) 32.0 - 36.0 g/dL    RDW 17.1  (H) 11.5 - 14.5 %    Platelets 478 (H) 150 - 450 x10*3/uL    Neutrophils % 95.4 40.0 - 80.0 %    Immature Granulocytes %, Automated 1.0 (H) 0.0 - 0.9 %    Lymphocytes % 2.9 13.0 - 44.0 %    Monocytes % 0.5 2.0 - 10.0 %    Eosinophils % 0.0 0.0 - 6.0 %    Basophils % 0.2 0.0 - 2.0 %    Neutrophils Absolute 27.31 (H) 1.20 - 7.70 x10*3/uL    Immature Granulocytes Absolute, Automated 0.30 0.00 - 0.70 x10*3/uL    Lymphocytes Absolute 0.83 (L) 1.20 - 4.80 x10*3/uL    Monocytes Absolute 0.13 0.10 - 1.00 x10*3/uL    Eosinophils Absolute 0.00 0.00 - 0.70 x10*3/uL    Basophils Absolute 0.05 0.00 - 0.10 x10*3/uL          [place] Coma Scale  Best Eye Response: Spontaneous  Best Verbal Response: Oriented  Best Motor Response: Follows commands  [place] Coma Scale Score: 15                MR brain w and wo IV contrast    Result Date: 12/27/2023  Interpreted By:  Devon Aceves and Bera Kaustav STUDY: MR BRAIN W AND WO IV CONTRAST; MR ORBIT W AND WO IV CONTRAST; 12/27/2023 12:18 am; 12/27/2023 12:13 am   INDICATION: Signs/Symptoms:optic neuritis.   COMPARISON: None.   ACCESSION NUMBER(S): [id number]; [id number]   ORDERING CLINICIAN: RADHA FRAUSTO   TECHNIQUE: Diffusion, T2, FLAIR, and T1 weighted MR images of the brain were obtained.  High resolution coronal T1 and T2 Dwyer fat-sat images of the orbits were obtained.  Following administration of 17 mL of Dotarem, gadolinium based intravenous contrast, post contrast T1 images of the brain and high resolution fat-suppressed axial and coronal T1 images of the orbits were acquired.   FINDINGS: Images are mildly degraded due to motion artifact.   CSF Spaces: The ventricles, sulci and basal cisterns are within normal limits.   Parenchyma: There is no diffusion restriction abnormality to suggest acute infarct.  There is no focal parenchymal signal abnormality.  No abnormal parenchymal enhancement is identified.  There is no mass effect or midline shift. There is no intracranial  hemorrhage. There are scattered periventricular and subcortical T2 and FLAIR white matter hyperintensities, some of which are oriented perpendicular to the ventricles. No associated abnormal enhancement.   Orbits:There is asymmetric abnormal enhancement of the left optic nerve. The orbits are otherwise normal. The optic chiasm is unremarkable.   Paranasal Sinuses and Mastoids: Visualized paranasal sinuses and mastoid air cells are unremarkable.       1. Scattered periventricular and subcortical nonenhancing T2 and FLAIR white matter hyperintensities within bilateral cerebral hemispheres are nonspecific, with the most common differential possibilities including sequelae of chronic microangiopathy versus inflammatory demyelinating disease. 2. Asymmetric enhancement of the left optic nerve, compatible with optic neuritis. 3. No evidence of acute intracranial abnormality. No other abnormal intracranial enhancement.   I personally reviewed the images/study and I agree with the findings as stated. This study was interpreted at [place], [place].   MACRO: None   Signed by: Devon Aceves 12/27/2023 3:02 AM Dictation workstation:   [id number]    MR orbit w and wo IV contrast    Result Date: 12/27/2023  Interpreted By:  Devon Aceves,  and Nati Lagunas STUDY: MR BRAIN W AND WO IV CONTRAST; MR ORBIT W AND WO IV CONTRAST; 12/27/2023 12:18 am; 12/27/2023 12:13 am   INDICATION: Signs/Symptoms:optic neuritis.   COMPARISON: None.   ACCESSION NUMBER(S): [id number]; [id number]   ORDERING CLINICIAN: RADHA FRAUSTO   TECHNIQUE: Diffusion, T2, FLAIR, and T1 weighted MR images of the brain were obtained.  High resolution coronal T1 and T2 Dwyer fat-sat images of the orbits were obtained.  Following administration of 17 mL of Dotarem, gadolinium based intravenous contrast, post contrast T1 images of the brain and high resolution fat-suppressed axial and coronal T1 images of the orbits were  acquired.   FINDINGS: Images are mildly degraded due to motion artifact.   CSF Spaces: The ventricles, sulci and basal cisterns are within normal limits.   Parenchyma: There is no diffusion restriction abnormality to suggest acute infarct.  There is no focal parenchymal signal abnormality.  No abnormal parenchymal enhancement is identified.  There is no mass effect or midline shift. There is no intracranial hemorrhage. There are scattered periventricular and subcortical T2 and FLAIR white matter hyperintensities, some of which are oriented perpendicular to the ventricles. No associated abnormal enhancement.   Orbits:There is asymmetric abnormal enhancement of the left optic nerve. The orbits are otherwise normal. The optic chiasm is unremarkable.   Paranasal Sinuses and Mastoids: Visualized paranasal sinuses and mastoid air cells are unremarkable.       1. Scattered periventricular and subcortical nonenhancing T2 and FLAIR white matter hyperintensities within bilateral cerebral hemispheres are nonspecific, with the most common differential possibilities including sequelae of chronic microangiopathy versus inflammatory demyelinating disease. 2. Asymmetric enhancement of the left optic nerve, compatible with optic neuritis. 3. No evidence of acute intracranial abnormality. No other abnormal intracranial enhancement.   I personally reviewed the images/study and I agree with the findings as stated. This study was interpreted at [place], [place].   MACRO: None   Signed by: Devon Aceves 12/27/2023 3:02 AM Dictation workstation:   [id number]   Interpreted By:  David Hull and Bera Kaustav     STUDY:  MR CERVICAL SPINE W AND WO IV CONTRAST; MR THORACIC SPINE W AND WO IV  CONTRAST;  12/28/2023 9:05 pm      INDICATION:  Signs/Symptoms:Optic neuritis, rule out MS.      COMPARISON:  None.      ACCESSION NUMBER(S):  [id number]; [id number]      ORDERING CLINICIAN:  LUCILA  NADYA      IMPRESSION:  1. No evidence of signal abnormality or abnormal enhancement within  the cervical and thoracic spinal cord.  2. Minimal spinal canal stenosis at C4-5 and C5-6. Otherwise, no  severe spinal canal or neural foraminal stenosis.     Assessment/Plan      Principal Problem:    Optic neuritis, left    48 year old woman with no significant medical history admitted for management of left optic neuritis in setting of acute vision loss in the left eye and enhancement of left optic nerve on MRI and radiologically isolated brain white matter signal abnormalities on MRI. Isolated optic neuritis vs MS vs other demyelinating disease are on differential. Reassuring that no lesions were found on C and T spine MRI. Will treat for acute process as below. Joint decision making done with patient to defer LP at this point as it wouldn't change current management as patient will receive close monitoring outpatient by neuroimmunology team.      #optic neuritis  - IV methylprednisolone 1g q18h for 3-5 days (4th dose today, 12/30, at 16:30)  - s/p MRI C and T spine w w/o contrast to eval for other lesions.  - awaiting results of serum CNS demyelinating panel  - Serum B12 and MMA checked due to distal paresthesias. Levels WNL    :: B12 631  - Vit D level 36, continue oral supplementation  - Will need outpatient neuroimmunology and neuro-ophthalmology follow up     #Headache  #Hx migraine  - Acetaminophen-caffeine 500-65mg q6hr PRN    #Elevated TRENTON  :: Negative PRAKASH  - Rheum follow up after discharge    #AUD  - High dose oral thiamine     #Tobacco use disorder  - Motivational interviewing to encourage quitting smoking  - Nicotine patch q24h     F: PRN  E: PRN  N: regular  A: piv     O2: room air  Bowel Regimen: PRN  DVT ppx: Lovenox      NOK: Elian () [phone]   Code Status: presumed full code      Destin Hebert MD, Psychiatry PGY-4. Staffed with attending neurologist Dr. Bolaños.

## 2023-12-31 VITALS
RESPIRATION RATE: 18 BRPM | BODY MASS INDEX: 32.11 KG/M2 | TEMPERATURE: 97.9 F | DIASTOLIC BLOOD PRESSURE: 80 MMHG | SYSTOLIC BLOOD PRESSURE: 131 MMHG | HEIGHT: 63 IN | HEART RATE: 84 BPM | WEIGHT: 181.22 LBS | OXYGEN SATURATION: 97 %

## 2023-12-31 PROBLEM — R76.8 ANA POSITIVE: Status: ACTIVE | Noted: 2023-12-31

## 2023-12-31 LAB
ALBUMIN SERPL BCP-MCNC: 3.6 G/DL (ref 3.4–5)
ANION GAP SERPL CALC-SCNC: 15 MMOL/L (ref 10–20)
BASOPHILS # BLD AUTO: 0.02 X10*3/UL (ref 0–0.1)
BASOPHILS NFR BLD AUTO: 0.1 %
BUN SERPL-MCNC: 15 MG/DL (ref 6–23)
CALCIUM SERPL-MCNC: 8.8 MG/DL (ref 8.6–10.6)
CHLORIDE SERPL-SCNC: 105 MMOL/L (ref 98–107)
CO2 SERPL-SCNC: 23 MMOL/L (ref 21–32)
CREAT SERPL-MCNC: 0.56 MG/DL (ref 0.5–1.05)
EOSINOPHIL # BLD AUTO: 0.06 X10*3/UL (ref 0–0.7)
EOSINOPHIL NFR BLD AUTO: 0.3 %
ERYTHROCYTE [DISTWIDTH] IN BLOOD BY AUTOMATED COUNT: 16.7 % (ref 11.5–14.5)
GFR SERPL CREATININE-BSD FRML MDRD: >90 ML/MIN/1.73M*2
GLUCOSE SERPL-MCNC: 179 MG/DL (ref 74–99)
HCT VFR BLD AUTO: 34.2 % (ref 36–46)
HGB BLD-MCNC: 11.1 G/DL (ref 12–16)
IMM GRANULOCYTES # BLD AUTO: 0.14 X10*3/UL (ref 0–0.7)
IMM GRANULOCYTES NFR BLD AUTO: 0.8 % (ref 0–0.9)
LYMPHOCYTES # BLD AUTO: 1 X10*3/UL (ref 1.2–4.8)
LYMPHOCYTES NFR BLD AUTO: 5.7 %
MAGNESIUM SERPL-MCNC: 1.8 MG/DL (ref 1.6–2.4)
MCH RBC QN AUTO: 28.1 PG (ref 26–34)
MCHC RBC AUTO-ENTMCNC: 32.5 G/DL (ref 32–36)
MCV RBC AUTO: 87 FL (ref 80–100)
METHYLMALONATE SERPL-SCNC: 0.11 UMOL/L (ref 0–0.4)
MONOCYTES # BLD AUTO: 0.29 X10*3/UL (ref 0.1–1)
MONOCYTES NFR BLD AUTO: 1.7 %
NEUTROPHILS # BLD AUTO: 15.9 X10*3/UL (ref 1.2–7.7)
NEUTROPHILS NFR BLD AUTO: 91.4 %
NRBC BLD-RTO: 0 /100 WBCS (ref 0–0)
PHOSPHATE SERPL-MCNC: 3.9 MG/DL (ref 2.5–4.9)
PLATELET # BLD AUTO: 430 X10*3/UL (ref 150–450)
POTASSIUM SERPL-SCNC: 3.7 MMOL/L (ref 3.5–5.3)
RBC # BLD AUTO: 3.95 X10*6/UL (ref 4–5.2)
RBC MORPH BLD: NORMAL
SODIUM SERPL-SCNC: 139 MMOL/L (ref 136–145)
WBC # BLD AUTO: 17.4 X10*3/UL (ref 4.4–11.3)

## 2023-12-31 PROCEDURE — 83735 ASSAY OF MAGNESIUM: CPT

## 2023-12-31 PROCEDURE — 2500000004 HC RX 250 GENERAL PHARMACY W/ HCPCS (ALT 636 FOR OP/ED)

## 2023-12-31 PROCEDURE — 94760 N-INVAS EAR/PLS OXIMETRY 1: CPT

## 2023-12-31 PROCEDURE — S4991 NICOTINE PATCH NONLEGEND: HCPCS

## 2023-12-31 PROCEDURE — 2500000001 HC RX 250 WO HCPCS SELF ADMINISTERED DRUGS (ALT 637 FOR MEDICARE OP)

## 2023-12-31 PROCEDURE — 36415 COLL VENOUS BLD VENIPUNCTURE: CPT

## 2023-12-31 PROCEDURE — 80069 RENAL FUNCTION PANEL: CPT

## 2023-12-31 PROCEDURE — 2500000002 HC RX 250 W HCPCS SELF ADMINISTERED DRUGS (ALT 637 FOR MEDICARE OP, ALT 636 FOR OP/ED)

## 2023-12-31 PROCEDURE — 85025 COMPLETE CBC W/AUTO DIFF WBC: CPT

## 2023-12-31 PROCEDURE — 1100000001 HC PRIVATE ROOM DAILY

## 2023-12-31 PROCEDURE — 99232 SBSQ HOSP IP/OBS MODERATE 35: CPT

## 2023-12-31 RX ORDER — MAGNESIUM SULFATE HEPTAHYDRATE 40 MG/ML
2 INJECTION, SOLUTION INTRAVENOUS ONCE
Status: DISCONTINUED | OUTPATIENT
Start: 2023-12-31 | End: 2024-01-01 | Stop reason: HOSPADM

## 2023-12-31 RX ORDER — PREDNISONE 10 MG/1
TABLET ORAL
Qty: 147 TABLET | Refills: 0 | Status: SHIPPED | OUTPATIENT
Start: 2023-12-31 | End: 2024-02-11

## 2023-12-31 RX ORDER — LANOLIN ALCOHOL/MO/W.PET/CERES
1000 CREAM (GRAM) TOPICAL DAILY
Qty: 90 TABLET | Refills: 0 | Status: SHIPPED | OUTPATIENT
Start: 2023-12-31 | End: 2023-12-31 | Stop reason: SDUPTHER

## 2023-12-31 RX ORDER — PANTOPRAZOLE SODIUM 40 MG/1
40 TABLET, DELAYED RELEASE ORAL
Qty: 42 TABLET | Refills: 0 | Status: SHIPPED | OUTPATIENT
Start: 2024-01-01 | End: 2024-02-12

## 2023-12-31 RX ORDER — CHOLECALCIFEROL (VITAMIN D3) 25 MCG
1000 TABLET ORAL DAILY
Qty: 90 TABLET | Refills: 0 | Status: SHIPPED | OUTPATIENT
Start: 2023-12-31 | End: 2024-03-30

## 2023-12-31 RX ORDER — ACETAMINOPHEN 325 MG/1
975 TABLET ORAL ONCE
Status: COMPLETED | OUTPATIENT
Start: 2023-12-31 | End: 2023-12-31

## 2023-12-31 RX ORDER — IBUPROFEN 200 MG
1 TABLET ORAL DAILY
Qty: 28 PATCH | Refills: 0 | Status: SHIPPED | OUTPATIENT
Start: 2023-12-31 | End: 2023-12-31 | Stop reason: SDUPTHER

## 2023-12-31 RX ORDER — METOCLOPRAMIDE HYDROCHLORIDE 5 MG/ML
10 INJECTION INTRAMUSCULAR; INTRAVENOUS ONCE
Status: COMPLETED | OUTPATIENT
Start: 2023-12-31 | End: 2023-12-31

## 2023-12-31 RX ORDER — KETOROLAC TROMETHAMINE 30 MG/ML
30 INJECTION, SOLUTION INTRAMUSCULAR; INTRAVENOUS ONCE
Status: COMPLETED | OUTPATIENT
Start: 2023-12-31 | End: 2023-12-31

## 2023-12-31 RX ORDER — CHOLECALCIFEROL (VITAMIN D3) 25 MCG
1000 TABLET ORAL DAILY
Qty: 90 TABLET | Refills: 0 | Status: SHIPPED | OUTPATIENT
Start: 2023-12-31 | End: 2023-12-31 | Stop reason: SDUPTHER

## 2023-12-31 RX ORDER — IBUPROFEN 200 MG
1 TABLET ORAL DAILY
Qty: 28 PATCH | Refills: 0 | Status: SHIPPED | OUTPATIENT
Start: 2023-12-31 | End: 2024-01-28

## 2023-12-31 RX ORDER — PREDNISONE 10 MG/1
TABLET ORAL
Qty: 147 TABLET | Refills: 0 | Status: SHIPPED | OUTPATIENT
Start: 2023-12-31 | End: 2023-12-31 | Stop reason: SDUPTHER

## 2023-12-31 RX ORDER — PANTOPRAZOLE SODIUM 40 MG/1
40 TABLET, DELAYED RELEASE ORAL
Qty: 42 TABLET | Refills: 0 | Status: SHIPPED | OUTPATIENT
Start: 2024-01-01 | End: 2023-12-31 | Stop reason: SDUPTHER

## 2023-12-31 RX ORDER — LANOLIN ALCOHOL/MO/W.PET/CERES
1000 CREAM (GRAM) TOPICAL DAILY
Qty: 90 TABLET | Refills: 0 | Status: SHIPPED | OUTPATIENT
Start: 2023-12-31 | End: 2024-03-30

## 2023-12-31 RX ADMIN — ACETAMINOPHEN, CAFFEINE 2 TABLET: 500; 65 TABLET, FILM COATED ORAL at 08:13

## 2023-12-31 RX ADMIN — ACETAMINOPHEN 975 MG: 325 TABLET ORAL at 04:21

## 2023-12-31 RX ADMIN — METHYLPREDNISOLONE SODIUM SUCCINATE 1000 MG: 1 INJECTION, POWDER, LYOPHILIZED, FOR SOLUTION INTRAMUSCULAR; INTRAVENOUS at 10:42

## 2023-12-31 RX ADMIN — Medication 1000 UNITS: at 08:13

## 2023-12-31 RX ADMIN — PANTOPRAZOLE SODIUM 40 MG: 40 TABLET, DELAYED RELEASE ORAL at 08:13

## 2023-12-31 RX ADMIN — CYANOCOBALAMIN TAB 1000 MCG 1000 MCG: 1000 TAB at 08:13

## 2023-12-31 RX ADMIN — METOCLOPRAMIDE 10 MG: 5 INJECTION, SOLUTION INTRAMUSCULAR; INTRAVENOUS at 12:17

## 2023-12-31 RX ADMIN — NICOTINE 1 PATCH: 14 PATCH, EXTENDED RELEASE TRANSDERMAL at 08:12

## 2023-12-31 RX ADMIN — SODIUM CHLORIDE 500 ML: 9 INJECTION, SOLUTION INTRAVENOUS at 12:17

## 2023-12-31 RX ADMIN — KETOROLAC TROMETHAMINE 30 MG: 30 INJECTION, SOLUTION INTRAMUSCULAR; INTRAVENOUS at 12:16

## 2023-12-31 ASSESSMENT — PAIN SCALES - GENERAL
PAINLEVEL_OUTOF10: 6
PAINLEVEL_OUTOF10: 4
PAINLEVEL_OUTOF10: 8
PAINLEVEL_OUTOF10: 8

## 2023-12-31 ASSESSMENT — PAIN DESCRIPTION - LOCATION: LOCATION: HEAD

## 2023-12-31 ASSESSMENT — PAIN - FUNCTIONAL ASSESSMENT
PAIN_FUNCTIONAL_ASSESSMENT: 0-10

## 2023-12-31 NOTE — DISCHARGE SUMMARY
"Discharge Diagnosis  Optic neuritis, left    Issues Requiring Follow-Up  - Completion of steroid taper  - Follow up with Neuro-Ophthalmology  - Follow up with Neuro-Immunology  - Follow up with Rheumatology    Test Results Pending At Discharge  Pending Labs       Order Current Status    CNS Demyelinating Disease,Serum In process            Hospital Course  49 y/o woman with no significant chronic medical history who presented to  ED on 12/26 at suggestion of outpatient neurology due to concern for optic neuritis in the setting of acute onset central field vision loss in the left eye. CBC, CMP and coags in ED unremarkable, brain MRI confirming findings compatible with optic neuritis (left) and non-specific white matter hyperintensities within bilateral cerebral hemispheres. Ophthalmology evaluated in the ED with impression of new RAPD concerning for retrobulbar optic neuritis of left eye. Due to these findings decision was made to admit patient to the neurology service for further work up and management. She was started on high-dose IV methylprednisolone q18hr from 12/28- 12/31 for total of 5 doses. MRI of C- and T-spine was obtained to evaluate for spinal lesions, but was read as \"...No evidence of signal abnormality or abnormal enhancement within the cervical and thoracic spinal cord.\" Serum B12, MMA, and Vit D levels were obtained which were WNL. TRENTON was positive, but reflex PRAKASH was negative. Referred to outpatient rheumatology for follow up of TRENTON. Serum CNS demyelinating panel pending as of time of discharge. Patient tolerated the course of high dose steroids without significant side effects, remained hemodynamically stable and on room air throughout hospitalization. Did have headache during hospitalization that responded to ketorolac, metoclopramide, magnesium, and IV fluids. By time of discharge, she was able to make out shapes and count fingers in her left eye. She was discharged on a prednisone taper, with " neuro-ophthalmology and neuro-immunology follow up. She was also counseled on smoking cessation and prescribed nicotine patches. She was discharged home 12/31.    Pertinent Physical Exam At Time of Discharge  Physical Exam  Constitutional:       Appearance: Normal appearance.   HENT:      Head: Normocephalic and atraumatic.      Mouth/Throat:      Mouth: Mucous membranes are moist.      Pharynx: Oropharynx is clear.   Eyes:      Extraocular Movements: Extraocular movements intact.      Pupils: Pupils are equal, round, and reactive to light.   Cardiovascular:      Rate and Rhythm: Normal rate and regular rhythm.   Pulmonary:      Effort: Pulmonary effort is normal.      Breath sounds: Normal breath sounds.   Abdominal:      General: Abdomen is flat.      Palpations: Abdomen is soft.      Tenderness: There is no abdominal tenderness.   Skin:     General: Skin is warm and dry.   Neurological:      Mental Status: She is alert.     Neurological Exam  Mental Status  Awake, alert and oriented to person, place and time. Speech is normal. Language is fluent with no aphasia. Attention and concentration are normal.     Cranial Nerves  CN II: Decreased central vision in left eye. Peripheral vision preserved.  CN III, IV, VI: Extraocular movements intact bilaterally. Pupils equal round and reactive to light bilaterally.  CN V: Facial sensation is normal.  CN VII: Full and symmetric facial movement.  CN VIII: Hearing is normal.  CN IX, X: Palate elevates symmetrically  CN XI: Shoulder shrug strength is normal.  CN XII: Tongue midline without atrophy or fasciculations.     Motor  Normal muscle bulk throughout. No fasciculations present. Normal muscle tone. No abnormal involuntary movements. Strength is 5/5 throughout all four extremities.     Sensory  Light touch is normal in upper and lower extremities.      Coordination  Right: Finger-to-nose normal.Left: Finger-to-nose normal.    Home Medications     Medication List      START  taking these medications     cholecalciferol 25 MCG (1000 UT) tablet; Commonly known as: Vitamin D-3;   Take 1 tablet (1,000 Units) by mouth once daily.   cyanocobalamin 1,000 mcg tablet; Commonly known as: Vitamin B-12; Take 1   tablet (1,000 mcg) by mouth once daily.   nicotine 14 mg/24 hr patch; Commonly known as: Nicoderm CQ; Place 1   patch over 24 hours on the skin once daily for 28 doses.   pantoprazole 40 mg EC tablet; Commonly known as: ProtoNix; Take 1 tablet   (40 mg) by mouth once daily in the morning. Take before meals. Do not   crush, chew, or split. Do not start before January 1, 2024.; Start taking   on: January 1, 2024   predniSONE 10 mg tablet; Commonly known as: Deltasone; Take 6 tablets   (60 mg) by mouth once daily for 7 days, THEN 5 tablets (50 mg) once daily   for 7 days, THEN 4 tablets (40 mg) once daily for 7 days, THEN 3 tablets   (30 mg) once daily for 7 days, THEN 2 tablets (20 mg) once daily for 7   days, THEN 1 tablet (10 mg) once daily for 7 days.; Start taking on:   December 31, 2023       Outpatient Follow-Up  No future appointments.    Destin Hebert MD

## 2023-12-31 NOTE — NURSING NOTE
1530 Patient discharged home. I noticed that in Epic is mentioned that she had money and credit cards sent to safe/protective services. The patient says that she doesn't remember to give anything to be place in safe with protective services. I called  Police to double check and they said that they do not have nothing for this patient and also nothing is placed at the casher office at 1st floor.

## 2023-12-31 NOTE — DISCHARGE INSTRUCTIONS
Ms. Suresh,    You were admitted for optic neuritis, an inflammatory condition affecting the nerve going to your left eye. You were treated with high dose steroids and will be discharged with a steroid taper:  - Prednisone 60mg (6 tabs) for 7 days, then  - Prednisone 50mg (5 tabs) for 7 days, then  - Prednisone 40mg (4 tabs) for 7 days, then  - Prednisone 30mg (3 tabs) for 7 days, then  - Prednisone 20mg (2 tabs) for 7 days, then  - Prednisone 10mg (1 tab) for 7 days  You will also be sent a prescription for vitamin D and vitamin B12 supplements. We strongly encourage to stop smoking as this is a significant risk factor in worsening of your disease. We will send a prescription for nicotine patches to help you quit. You cna follow up with your primary care provider for further recommendations.  Please follow up with Neuro-Immunology and Neuro-Ophthalmology for further evaluation and care of your optic neuritis. Please also follow up with Rheumatology for incidentally found high TRENTON.    It was a pleasure taking care of you,  Dr. Hebert on behalf of [facility] Inpatient Neurology Team

## 2023-12-31 NOTE — PROGRESS NOTES
Reason for consult: Vision loss left eye      HPI: 48 year old female no past ocular history presents for 2 weeks of blurred vision left eye. Patient states that she has noticed decreased vision left eye without associated pain, flashes/floaters. Vision started as becoming blurrier and more fuzzy but has developed into poor vision overall with mainly just shadows and movement seen out of that eye. States something similar but less severe happened last year which was dx as an ocular migraine and resolved itself. Patient endorses limb tingling, burning, ?weakness at times over months intermittently without obvious inciting factors. Does say that work has been more stressful in the last weeks before vision changes. No fmaily hx of MS, neurologic conditions.      Past Medical History: as above  Family History: reviewed and not pertinent to chief complaint  Medications: please refer to medication reconciliation  Allergies: please refer to patient allergy list     OCULAR EXAMINATION:  Near visual acuity (VA) right eye 20/20, left eye CF 2'  Pupils: 4>2 both eyes (+) relative afferent pupillary defect (RAPD) left eye   IOP: 12/14  Motility: EOM full both eyes, nonpainful without diplopia   Confrontation visual fields: Full to count fingers right eye, FTHM left eye   Color: 11/11 right eye, unable left eye      ANTERIOR SEGMENT:  OD:  Lids/Lashes: normal anatomy and position  Conjunctiva: white and quiet  Cornea: clear  AC: deep and quiet  Iris: round and reactive  Lens:     OS:  Lids/Lashes: normal anatomy and position  Conjunctiva: white and quiet  Cornea: clear  AC: deep and quiet  Iris: round and reactive  Lens:      DILATED FUNDUS EXAM: (Dilated with 1% tropicamide and 2.5% phenylephrine)     OD:  Cup-to-disc ratio: 0.35  Optic nerve: pink with sharp margins   Vitreous: clear  Macula: flat and attached without lesions  Vessels: normal course and caliber  Periphery: no holes, tears, or detachments     OS:  Cup-to-disc  ratio: 0.35  Optic nerve: pink with sharp margins   Vitreous: clear  Macula: flat and attached without lesions  Vessels: normal course and caliber  Periphery: no holes, tears, or detachments     Imaging:  MRI brain/orbit w and wo contrast, with fat suppression, personally reviewed  -Left optic nerve enhancement, diffuse bilateral cerebral hyperintesities      Update 12/28/23  Patient admitted to neurology, has not yet started steroid treatment this morning but plan to start later today. Denies any worsening symptoms or changes to vision.  Visual acuity (VA) right eye 20/20, left eye CF 2', Pupil 4>2 right eye 4 and minimally reactive left eye, + relative afferent pupillary defect (RAPD) left eye, EOM full and nonpainful, CVF FTCF right eye and left eye full to hand motion temporally, constricted nasally to hand motion. Intraocular pressure (IOP) 15/18. Color 11/11 right eye, unable left eye.   SLE wnl both eyes      Update 12/29/23  Patient admitted to neurology, started IV steroid yesterday and patient states that she notices more light and color out of the left eye. Feels as though it is less hindering to binocular vision compared to yesterday.  Visual acuity (VA) right eye 20/20, left eye CF 2', Pupil 4>2 both eyes no relative afferent pupillary defect (RAPD), EOM full and nonpainful, CVF FTCF right eye and left eye FTHM. Intraocular pressure (IOP) 20/19. Color 11/11 right eye, unable left eye.   SLE wnl both eyes      Update 12/30/23  Patient currently on day 3 of IVMP. States she feels her vision is improving in the left eye compared to yesterday. No eye pain.  Visual acuity (VA) right eye 20/20, left eye 20/800; Pupil 4>2 both eyes + relative afferent pupillary defect (RAPD) left eye, EOM full and nonpainful, CVF FTCF both eyes. Intraocular pressure (IOP) 18/18. Color 11/11 right eye, unable left eye.   SLE wnl both eyes  DFE wnl both eyes    Update 12/31/23  Status post (s/p) 5 doses of IVMP. Feels vision is  slowly improving as if a central shadow is slowly being lifted from her vision OS. Generalized headache but no eye pain.  Visual acuity (VA) OD 20/20, OS 20/800; Pupils 4>2mm OU (+) rAPD OS, EOM full and nonpainful, CVF full to count fingers OU. IOP 18/19. Color 11/11 right eye, unable OS due to poor vision.       Assessment:  This is a 48 year old female no past ocular history presents with decreased vision left eye to , new relative afferent pupillary defect (RAPD) concerning for optic neuritis left eye per outside providers. Anterior segment examination wnl both eyes, dilated fundus exam without optic nerve pathology. MRI orbit/brain w/wo contrast with fat suppression shows left optic nerve enhancement, diffuse bilateral cerebral hyperintensities. This presentation and workup is concerning for retrobulbar optic neuritis left eye along with possible demyelinating process systemically. Recommend further workup with neurology consultation and probable admission with IV steroids.      Recommendations:  #Optic neuritis left eye   - MRI showing left optic nerve enhancement concerning for optic neuritis  - Examination concerning considering poor visual acuity (VA) and relative afferent pupillary defect (RAPD) left eye   - Patient admitted to neurology for IV steroids and further workup/imaging  - Labs and imaging per neurology. Antinuclear antibodies test (TRENTON) (+), other antibodies negative. CNS demyelinating panel pending  - B12 631, vitamin D 36 (being supplemented)  - MRI C/T spine no abnormal enhancement   - Exam shows stable improvement of left eye symptoms   - Neurology planning to dc today with PO steroid taper - we will arrange outpatient follow up with neuro-ophthalmology within the next 2 weeks           Thank you for the consult. Please contact the Ophthalmology service with further questions or concerns.     Chato Gibson MD   Ophthalmology PGY2     Ophthalmology Adult Pager - [phone]  Ophthalmology  Pediatrics Pager - [phone]     For adult follow-up appointments, call: [phone]  For pediatric follow-up appointments, call: [phone]

## 2023-12-31 NOTE — CARE PLAN
The patient's goals for the shift include      The clinical goals for the shift include patient's vision will improve by end of shift    Over the shift, the patient did not make progress toward the following goals. Barriers to progression include . Recommendations to address these barriers include .

## 2024-01-12 ENCOUNTER — OFFICE VISIT (OUTPATIENT)
Dept: NEUROLOGY | Facility: HOSPITAL | Age: 49
End: 2024-01-12
Payer: COMMERCIAL

## 2024-01-12 ENCOUNTER — LAB (OUTPATIENT)
Dept: LAB | Facility: LAB | Age: 49
End: 2024-01-12
Payer: COMMERCIAL

## 2024-01-12 VITALS
WEIGHT: 195 LBS | TEMPERATURE: 96.9 F | RESPIRATION RATE: 18 BRPM | SYSTOLIC BLOOD PRESSURE: 133 MMHG | HEART RATE: 105 BPM | DIASTOLIC BLOOD PRESSURE: 81 MMHG | BODY MASS INDEX: 34.55 KG/M2 | HEIGHT: 63 IN

## 2024-01-12 DIAGNOSIS — H46.9 OPTIC NEURITIS: Primary | ICD-10-CM

## 2024-01-12 DIAGNOSIS — H46.9 OPTIC NEURITIS: ICD-10-CM

## 2024-01-12 DIAGNOSIS — H46.9 OPTIC NEURITIS, LEFT: ICD-10-CM

## 2024-01-12 PROCEDURE — 99417 PROLNG OP E/M EACH 15 MIN: CPT | Performed by: PSYCHIATRY & NEUROLOGY

## 2024-01-12 PROCEDURE — 99215 OFFICE O/P EST HI 40 MIN: CPT | Performed by: PSYCHIATRY & NEUROLOGY

## 2024-01-12 PROCEDURE — 1036F TOBACCO NON-USER: CPT | Performed by: PSYCHIATRY & NEUROLOGY

## 2024-01-12 PROCEDURE — 36415 COLL VENOUS BLD VENIPUNCTURE: CPT

## 2024-01-12 ASSESSMENT — PAIN SCALES - GENERAL: PAINLEVEL: 0-NO PAIN

## 2024-01-12 NOTE — PROGRESS NOTES
Subjective     Margarita Coronado is a 48 y.o. year old female here for optic neuritis    History of Present Illness    AMB NEURO IMMUNOLOGY HPI:   Date of Onset: 12/23  Date of Diagnosis: n/a  Last MRI Brain: 12/23, left ON enhancement plus non specific WM changes  Last MRI Cervical: 12/23 no lesions  Last MRI Thoracis: 12/23 no lesions  Disease Course at Onset: monophasic  Disease Course Now: monophasic  Current Disease Modifying Therapies: IVMP plus taper of prednisone  Previous Disease Modifying Therapies: none  Cerebrospinal Fluid: none  Neuromyelitis Optica: 12/28 still pending  Myelin Oligodendrocyte Glycoprotein: 12/28 still pending    HPI (with excerpts from discharge summary)  She was in the hospital at the end of Dec 2023, presented to  ED on 12/26 for acute onset central field vision loss in the left eye, brain MRI confirming findings compatible with optic neuritis (left) and non-specific white matter hyperintensities within bilateral cerebral hemispheres. She was started on high-dose IV methylprednisolone q18hr from 12/28- 12/31 for total of 5 doses.  Serum B12, MMA, and Vit D levels were obtained which were WNL. TRENTON was positive, but reflex PRAKASH was negative. Referred to outpatient rheumatology for follow up of TRENTON. By time of discharge, she was able to make out shapes and count fingers in her left eye. She was discharged on a prednisone taper.    Interval Hx:  Feels like back to 50-60% vision in the left eye, can see outline of shapes but cannot read. Has follow up with Dr. Cartwright on 01/31. No new symptoms. Tingling in hands and feet is better, only occasionally now.   Has had bouts of weakness in the past, like her legs felt weak for 30 seconds  No heat sensitivity, no bladder issues.   Denies other symptoms suggestive of MS.  On prednisone taper.     10-point review of systems was negative except as mentioned in the HPI.    No Known Allergies     SH:  Smoker    FH:  Non contributory    Past Medical  "History:   Diagnosis Date    Personal history of other mental and behavioral disorders 04/15/2019    History of anxiety    Has had many migraines in her 20s    Patient Active Problem List   Diagnosis    Cigarette smoker    Simple obesity    Personal history of gastric bypass    Depression with anxiety    Optic neuritis, left    TRENTON positive      S/p bariatric surgery      Current Outpatient Medications:     cholecalciferol (Vitamin D-3) 25 MCG (1000 UT) tablet, Take 1 tablet (1,000 Units) by mouth once daily., Disp: 90 tablet, Rfl: 0    cyanocobalamin (Vitamin B-12) 1,000 mcg tablet, Take 1 tablet (1,000 mcg) by mouth once daily., Disp: 90 tablet, Rfl: 0    nicotine (Nicoderm CQ) 14 mg/24 hr patch, Place 1 patch over 24 hours on the skin once daily for 28 doses., Disp: 28 patch, Rfl: 0    pantoprazole (ProtoNix) 40 mg EC tablet, Take 1 tablet (40 mg) by mouth once daily in the morning. Take before meals. Do not crush, chew, or split. Do not start before January 1, 2024., Disp: 42 tablet, Rfl: 0    predniSONE (Deltasone) 10 mg tablet, Take 6 tablets (60 mg) by mouth once daily for 7 days, THEN 5 tablets (50 mg) once daily for 7 days, THEN 4 tablets (40 mg) once daily for 7 days, THEN 3 tablets (30 mg) once daily for 7 days, THEN 2 tablets (20 mg) once daily for 7 days, THEN 1 tablet (10 mg) once daily for 7 days., Disp: 147 tablet, Rfl: 0     Physical Exam  /81   Pulse 105   Temp 36.1 °C (96.9 °F)   Resp 18   Ht 1.6 m (5' 3\")   Wt 88.5 kg (195 lb)   LMP 12/26/2023   BMI 34.54 kg/m²       Neurological Exam  Mental Status  Awake, alert and oriented to person, place and time. Speech is normal. Language is fluent with no aphasia. Attention and concentration are normal.     Cranial Nerves  CN II: Decreased central vision in left eye. Peripheral vision preserved. Red desaturation and RAPD left eye  CN III, IV, VI: Extraocular movements intact    CN V: Facial sensation is normal.  CN VII: Full and symmetric " facial movement.  CN VIII: Hearing is normal.  CN IX, X: Palate elevates symmetrically  CN XI: Shoulder shrug strength is normal.  CN XII: Tongue midline without atrophy or fasciculations.     Motor  Normal muscle bulk throughout. No fasciculations present. Normal muscle tone. Strength is 5/5 throughout all four extremities.     Sensory  Light touch is normal in upper and lower extremities.      Coordination  Right: Finger-to-nose normal.Left: Finger-to-nose normal.     Gait normal                          Neurological Exam  Physical Exam      Assessment/Plan   48 year old woman with a recent episode of left eye optic neuritis, with fairly long enhancement of the left optic nerve, still with left eye visual defect, improving to some extent, with brain MRI showing non-specific white matter changes in a patient with history of smoking and migraines. NMO/MOG not resulted yet, actually appear as cancelled. I will send again today.  I will monitor her closely and get a new brain MRI in March to see if she has new lesions consistent with MS. Hopefully her vision will keep getting better, to follow with Dr Cartwright.  Importance of adopting a healthy lifestyle, including following a healthy diet, exercising, taking Vit D, and especially of no smoking were discussed.      Chu Blum MD PhD

## 2024-01-12 NOTE — PATIENT INSTRUCTIONS
Margarita,    We could be dealing with early MS but we do not have enough evidence. I will monitor you closely and get a new brain MRI in March to see if you have new lesions consistent with MS.  Hopefully your vision will keep getting better, please follow with Dr Cartwright.  Please go to the lab today as we discussed.  Please quit smoking.   I will call you with results.     Chu Blum MD PhD

## 2024-01-22 RX ORDER — PREGABALIN 75 MG/1
1 CAPSULE ORAL 2 TIMES DAILY
COMMUNITY
Start: 2022-01-20

## 2024-01-22 RX ORDER — SEMAGLUTIDE 0.25 MG/.5ML
INJECTION, SOLUTION SUBCUTANEOUS
COMMUNITY
Start: 2023-05-11

## 2024-01-22 RX ORDER — MELOXICAM 7.5 MG/1
TABLET ORAL
COMMUNITY
Start: 2022-02-14

## 2024-01-22 RX ORDER — DULOXETIN HYDROCHLORIDE 20 MG/1
20 CAPSULE, DELAYED RELEASE ORAL DAILY
COMMUNITY

## 2024-01-22 RX ORDER — DEXTROAMPHETAMINE SACCHARATE, AMPHETAMINE ASPARTATE MONOHYDRATE, DEXTROAMPHETAMINE SULFATE AND AMPHETAMINE SULFATE 7.5; 7.5; 7.5; 7.5 MG/1; MG/1; MG/1; MG/1
CAPSULE, EXTENDED RELEASE ORAL
COMMUNITY
Start: 2023-12-20

## 2024-01-22 RX ORDER — TRAZODONE HYDROCHLORIDE 150 MG/1
150 TABLET ORAL NIGHTLY
COMMUNITY

## 2024-01-22 RX ORDER — INDOMETHACIN 75 MG/1
CAPSULE, EXTENDED RELEASE ORAL
COMMUNITY
Start: 2022-02-10

## 2024-01-22 RX ORDER — BUPROPION HYDROCHLORIDE 200 MG/1
200 TABLET, EXTENDED RELEASE ORAL 2 TIMES DAILY
COMMUNITY
Start: 2021-06-03

## 2024-01-22 RX ORDER — TOPIRAMATE SPINKLE 25 MG/1
CAPSULE ORAL
COMMUNITY

## 2024-01-22 RX ORDER — HYDROXYZINE HYDROCHLORIDE 25 MG/1
25 TABLET, FILM COATED ORAL 3 TIMES DAILY PRN
COMMUNITY
Start: 2022-02-14

## 2024-01-22 RX ORDER — METHYLPHENIDATE HYDROCHLORIDE 20 MG/1
TABLET ORAL
COMMUNITY
Start: 2023-06-08

## 2024-01-22 RX ORDER — ATOMOXETINE 60 MG/1
60 CAPSULE ORAL DAILY
COMMUNITY

## 2024-01-24 ENCOUNTER — OFFICE VISIT (OUTPATIENT)
Dept: RHEUMATOLOGY | Facility: CLINIC | Age: 49
End: 2024-01-24
Payer: COMMERCIAL

## 2024-01-24 ENCOUNTER — LAB (OUTPATIENT)
Dept: LAB | Facility: LAB | Age: 49
End: 2024-01-24
Payer: COMMERCIAL

## 2024-01-24 VITALS
HEIGHT: 63 IN | HEART RATE: 111 BPM | WEIGHT: 200.5 LBS | BODY MASS INDEX: 35.53 KG/M2 | TEMPERATURE: 97.3 F | DIASTOLIC BLOOD PRESSURE: 67 MMHG | SYSTOLIC BLOOD PRESSURE: 106 MMHG

## 2024-01-24 DIAGNOSIS — R76.8 ANA POSITIVE: ICD-10-CM

## 2024-01-24 DIAGNOSIS — H46.9 OPTIC NEURITIS: ICD-10-CM

## 2024-01-24 DIAGNOSIS — M35.00 SJOGREN SYNDROME, UNSPECIFIED (MULTI): ICD-10-CM

## 2024-01-24 DIAGNOSIS — H46.9 OPTIC NEURITIS: Primary | ICD-10-CM

## 2024-01-24 LAB
IGG SERPL-MCNC: 910 MG/DL (ref 700–1600)
IGG1 SER-MCNC: 569 MG/DL (ref 490–1140)
IGG2 SER-MCNC: 270 MG/DL (ref 150–640)
IGG3 SER-MCNC: 26 MG/DL (ref 11–85)
IGG4 SER-MCNC: 13 MG/DL (ref 3–200)
RHEUMATOID FACT SER NEPH-ACNC: <10 IU/ML (ref 0–15)

## 2024-01-24 PROCEDURE — 82784 ASSAY IGA/IGD/IGG/IGM EACH: CPT

## 2024-01-24 PROCEDURE — 86200 CCP ANTIBODY: CPT

## 2024-01-24 PROCEDURE — 99215 OFFICE O/P EST HI 40 MIN: CPT | Mod: 27,GC | Performed by: STUDENT IN AN ORGANIZED HEALTH CARE EDUCATION/TRAINING PROGRAM

## 2024-01-24 PROCEDURE — 86053 AQAPRN-4 ANTB FLO CYTMTRY EA: CPT

## 2024-01-24 PROCEDURE — 85730 THROMBOPLASTIN TIME PARTIAL: CPT

## 2024-01-24 PROCEDURE — 86147 CARDIOLIPIN ANTIBODY EA IG: CPT

## 2024-01-24 PROCEDURE — 99205 OFFICE O/P NEW HI 60 MIN: CPT | Performed by: STUDENT IN AN ORGANIZED HEALTH CARE EDUCATION/TRAINING PROGRAM

## 2024-01-24 PROCEDURE — 86255 FLUORESCENT ANTIBODY SCREEN: CPT

## 2024-01-24 PROCEDURE — 86431 RHEUMATOID FACTOR QUANT: CPT

## 2024-01-24 PROCEDURE — 1036F TOBACCO NON-USER: CPT | Performed by: STUDENT IN AN ORGANIZED HEALTH CARE EDUCATION/TRAINING PROGRAM

## 2024-01-24 PROCEDURE — 85613 RUSSELL VIPER VENOM DILUTED: CPT

## 2024-01-24 PROCEDURE — 86146 BETA-2 GLYCOPROTEIN ANTIBODY: CPT

## 2024-01-24 PROCEDURE — 86780 TREPONEMA PALLIDUM: CPT

## 2024-01-24 PROCEDURE — 36415 COLL VENOUS BLD VENIPUNCTURE: CPT

## 2024-01-24 ASSESSMENT — PAIN SCALES - GENERAL: PAINLEVEL: 0-NO PAIN

## 2024-01-24 NOTE — PROGRESS NOTES
Subjective   Patient ID: Margarita Suresh is a 48 y.o. female who presents for Abnormal Lab.  HPI    The patient is a 48 year old female with a PMH of recently diagnosed left optic neuritis, brain MRI confirming findings compatible with optic neuritis (left) and non-specific white matter hyperintensities within bilateral cerebral hemispheres. She was started on high-dose IV methylprednisolone q18hr from 12/28- 12/31 for total of 5 doses.  Serum B12, MMA, and Vit D levels were obtained which were WNL. Referred to rheumatology for positive TRENTON.     Today she notes chronic pain in bilateral knees and elbows. Has had issues with torn meniscus in left knee, has OA of both knees. Denies any joint swelling. Denies any rashes. No photosensitivity, no raynauds. Notes dry mouth and dry eyes for many years. Notes morning stiffness lasting 30 minutes. Notes chronic peripheral numbness and tingling.     Labs reviewed:  - TRENTON 1:320 Nucleolar, PRAKASH negative   - CMP grossly normal  - CBC with WBC 17.4 (likely 2/2 steroid use), otherwise grossly normal     Imaging:  MRI brain:  IMPRESSION:  1. Scattered periventricular and subcortical nonenhancing T2 and  FLAIR white matter hyperintensities within bilateral cerebral  hemispheres are nonspecific, with the most common differential  possibilities including sequelae of chronic microangiopathy versus  inflammatory demyelinating disease.  2. Asymmetric enhancement of the left optic nerve, compatible with  optic neuritis.  3. No evidence of acute intracranial abnormality. No other abnormal  intracranial enhancement.    MRI cervical and thoracic spine:  IMPRESSION:  1. No evidence of signal abnormality or abnormal enhancement within  the cervical and thoracic spinal cord.  2. Minimal spinal canal stenosis at C4-5 and C5-6. Otherwise, no  severe spinal canal or neural foraminal stenosis.    Family History:  No autoimmune disease    Social History: Smokes half a pack per day for 30+ years,  history of alcohol abuse (quit 2 months ago) drank a bottle of wine per day before that. Owns an asphalt pavement company     Surgical History: Gastric bypass surgery in 2009      Review of Systems  Constitutional: Denies fever, chills   Eyes: Notes chronic dry eyes  ENT: Denies dry mouth, loss of taste, sores in the mouth, or difficulty swallowing   Cardiovascular: Denies chest pain, palpitations   Respiratory: Denies shortness of breath   Gastrointestinal: Denies changes in bowl or bladder function, nausea, vomiting, heartburn   Integumentary: Denies photosensitivity, rash or lesions, Raynaud's   Neurological: Notes chronic numbness and tingling   Hematologic/Lymphatic: Denies bleeding, alopecia, Raynaud's phenomena   MSK: As per HPI. Denies weakness, difficulty rising from chair, aches, problems with hand       Objective   Physical Exam  General: AAOx3. Cooperative.   Head: normocephalic, atraumatic   Eyes: EOMI, conjunctiva clear, sclera white, anicteric   Ears: hearing intact   Nose: no deformity   Throat/Mouth: No oral deformities. Mucosa appear moist. No oral ulcers noted.   Neck/Lymph: FROM. trachea midline   Lungs: chest expansion symmetric Clear to auscultation bilaterally. No wheezing, rhonchi, stridor.   Heart: S1, S2, RRR. No murmur, rub.   Abdomen: Soft, non-tender without masses   Neuro: CN II-XII grossly intact, no focal deficit   Skin: No rashes, ulcers or photosensitive areas   MSK: Upper Extremities:   Hand/Fingers: No redness, swelling, pain at DIP, PIP, or MCP joints, FROM grossly.   Wrists: no erythema, swelling, or pain at wrist, FROM grossly   Elbows: No swelling, pain, erythema on elbows, FROM grossly   Shoulders: no swelling, redness, tenderness at shoulders   Lower Extremities:   Hips: No obvious deformities. no joint tenderness, normal ROM grossly   Knees: No pain, deformities, swelling, rashes, warmth, normal ROM grossly   Ankles, feet: no deformities, swelling, ulceration, warmth,  swelling, synovitis at ankle joints, normal ROM grossly.     Assessment/Plan   Diagnoses and all orders for this visit:  Optic neuritis  -     Biopsy salivary gland; Future  -     CT chest wo IV contrast; Future  -     NMO/AQP4-IgG, Serum; Future  -     IgG Subclasses (1, 2, 3, and 4); Future  -     Beta-2 Glycoprotein Antibodies; Future  -     Lupus Anticoag. With Interpretation[[place]]; Future  -     Anti-Cardiolipin Antibody (IgA, IgG, and IgM); Future  -     NMDA Receptor Autoantibody Test; Future  -     Syphilis Screen with Reflex; Future  -     Rheumatoid Factor; Future  TRENTON positive  -     Referral to Rheumatology  -     Biopsy salivary gland; Future  Sjogren syndrome, unspecified (CMS/HCC)  -     Biopsy salivary gland; Future  -     CT chest wo IV contrast; Future  -     NMO/AQP4-IgG, Serum; Future  -     IgG Subclasses (1, 2, 3, and 4); Future  -     Beta-2 Glycoprotein Antibodies; Future  -     Lupus Anticoag. With Interpretation[[place]]; Future  -     Anti-Cardiolipin Antibody (IgA, IgG, and IgM); Future  -     NMDA Receptor Autoantibody Test; Future  -     Syphilis Screen with Reflex; Future  -     Citrulline Antibody, IgG; Future       The patient is a 48 year old female with a PMH of recently diagnosed left optic neuritis, brain MRI confirming findings compatible with optic neuritis (left) and non-specific white matter hyperintensities within bilateral cerebral hemispheres. She was started on high-dose IV methylprednisolone q18hr from 12/28- 12/31 for total of 5 doses.  Serum B12, MMA, and Vit D levels were obtained which were WNL. Referred to rheumatology for positive TRENTON.     She notes chronic pain in bilateral knees, chronic dry eyes and dry mouth. Notes chronic peripheral numbness and tingling. No synovitis on exam.     Labs reviewed:  - TRENTON 1:320 Nucleolar, PRAKASH negative   - CMP grossly normal  - CBC with WBC 17.4 (likely 2/2 steroid use), otherwise grossly normal      Given her clinical picture with  optic neuritis, periventricular white matter changes, positive TRENTON and chronic sicca symptoms along with peripheral neuropathy, Sjogren's disease remains on the differential. Will also obtain CT chest to check for lymphadenopathy, granulomas (rule out sarcoid). Will obtain APS antibodies.     PLAN:  - Recommend continuing Prednisone 30 mg for 3-4 weeks followed by a slow taper, while we further investigate any underlying autoimmune disease causing optic neuritis. Will reach out to neurology regarding this.   - Obtain detailed work up with anti-NMO, aq4, anti-NMDA Abs.   - Obtain salivary gland biopsy to further investigate Sjogren's  - Obtain CT chest as stated above   - Obtain IgG Subtypes     Case discussed with Dr. Gonzalo Feliz D.O  PGY-IV Rheumatology    Teaching Statement    Patient interviewed and examined with Fellow.  I personally verified the key and critical portions of the history and physical examination and reviewed the documentation. History and physical as recorded above. I agree with the assessment and plan of the fellow.  48 yr old WF with left optic neuritis and periventricular white matter changes. She reports chronic sicca symptoms and numbness and tingling in hands and feet.   I reviewed the imaging.   She is TRENTON positive but PRAKASH negative. Sjogren's syndrome is a possibility.   Workup outlined above. Will follow up pending results.     Reviewed and approved by ROMELIA FERNANDES on 1/25/24 at 11:06 AM.      Carlos Feliz DO 01/24/24 1:42 PM

## 2024-01-25 LAB
B2 GLYCOPROT1 IGA SER-ACNC: <0.6 U/ML
B2 GLYCOPROT1 IGG SER-ACNC: <1.4 U/ML
B2 GLYCOPROT1 IGM SER-ACNC: 2.6 U/ML
CARDIOLIPIN IGA SERPL-ACNC: 1.1 APL U/ML
CARDIOLIPIN IGG SER IA-ACNC: <1.6 GPL U/ML
CARDIOLIPIN IGM SER IA-ACNC: 0.7 MPL U/ML
CCP IGG SERPL-ACNC: <1 U/ML
DRVVT SCREEN TO CONFIRM RATIO: 0.89 RATIO
DRVVT/DRVVT CFM NRMLZD PPP-RTO: 0.9 RATIO
DRVVT/DRVVT CFM P DOAC NEUT NORM PPP-RTO: 0.98 RATIO
LA 2 SCREEN W REFLEX-IMP: NORMAL
NORMALIZED SCT PPP-RTO: 1.15 RATIO
SILICA CLOTTING TIME CONFIRMATION: 1.04 RATIO
SILICA CLOTTING TIME SCREEN: 1.19 RATIO
TREPONEMA PALLIDUM IGG+IGM AB [PRESENCE] IN SERUM OR PLASMA BY IMMUNOASSAY: NONREACTIVE

## 2024-01-31 ENCOUNTER — OFFICE VISIT (OUTPATIENT)
Dept: OPHTHALMOLOGY | Facility: CLINIC | Age: 49
End: 2024-01-31
Payer: COMMERCIAL

## 2024-01-31 DIAGNOSIS — H46.9 OPTIC NEURITIS, LEFT: Primary | ICD-10-CM

## 2024-01-31 LAB — AQP4 H2O CHANNEL IGG SERPL QL: NEGATIVE

## 2024-01-31 PROCEDURE — 99205 OFFICE O/P NEW HI 60 MIN: CPT | Performed by: PSYCHIATRY & NEUROLOGY

## 2024-01-31 PROCEDURE — 92133 CPTRZD OPH DX IMG PST SGM ON: CPT | Performed by: PSYCHIATRY & NEUROLOGY

## 2024-01-31 PROCEDURE — 92083 EXTENDED VISUAL FIELD XM: CPT | Performed by: PSYCHIATRY & NEUROLOGY

## 2024-01-31 ASSESSMENT — ENCOUNTER SYMPTOMS
MUSCULOSKELETAL NEGATIVE: 0
NEUROLOGICAL NEGATIVE: 0
HEMATOLOGIC/LYMPHATIC NEGATIVE: 0
RESPIRATORY NEGATIVE: 0
GASTROINTESTINAL NEGATIVE: 0
ALLERGIC/IMMUNOLOGIC NEGATIVE: 0
CONSTITUTIONAL NEGATIVE: 0
PSYCHIATRIC NEGATIVE: 0
EYES NEGATIVE: 1
ENDOCRINE NEGATIVE: 0
CARDIOVASCULAR NEGATIVE: 0

## 2024-01-31 ASSESSMENT — CONF VISUAL FIELD
METHOD: COUNTING FINGERS
OS_NORMAL: 1
OD_INFERIOR_NASAL_RESTRICTION: 0
OD_SUPERIOR_TEMPORAL_RESTRICTION: 0
OD_NORMAL: 1
OS_SUPERIOR_TEMPORAL_RESTRICTION: 0
OS_INFERIOR_TEMPORAL_RESTRICTION: 0
OS_SUPERIOR_NASAL_RESTRICTION: 0
OD_INFERIOR_TEMPORAL_RESTRICTION: 0
OD_SUPERIOR_NASAL_RESTRICTION: 0
OS_INFERIOR_NASAL_RESTRICTION: 0

## 2024-01-31 ASSESSMENT — EXTERNAL EXAM - LEFT EYE: OS_EXAM: NORMAL

## 2024-01-31 ASSESSMENT — TONOMETRY
OD_IOP_MMHG: 16
IOP_METHOD: GOLDMANN APPLANATION
OS_IOP_MMHG: 18

## 2024-01-31 ASSESSMENT — EXTERNAL EXAM - RIGHT EYE: OD_EXAM: NORMAL

## 2024-01-31 ASSESSMENT — SLIT LAMP EXAM - LIDS
COMMENTS: NORMAL
COMMENTS: NORMAL

## 2024-01-31 ASSESSMENT — VISUAL ACUITY
METHOD: SNELLEN - LINEAR
OS_SC: 20/100+1
OD_SC: 20/20
OS_PH_SC: 20/70+1

## 2024-01-31 ASSESSMENT — CUP TO DISC RATIO
OS_RATIO: 0.4
OD_RATIO: 0.4

## 2024-01-31 NOTE — PROGRESS NOTES
Assessment and Plan    12/27/2023 MRI brain & orbits with contrast, which I personally reviewed, shows enhancement of the left optic nerve intraorbital, canalicular and intracranial segments. There are bihemispheric white matter FLAIR lesions with significant right occipital horn dilation and periventricular white matter FLAIR changes.  03/21/2008 MRI brain with contrast, which I personally reviewed, shows no lesion with dilation of the occipital horn of the right lateral ventricle.    01/24/2024 syphilis non-reactive (NR). Lupus anticoagulant, anti-cardiolipin abs, beta-2-glycoprotein abs negative. RF <10.   12/28/2023 antinuclear antibodies test (TRENTON) 1:320 with negative PRAKASH.  02/17/2023 B12 643. Folic acid wnl.  01/13/2022 syphilis non-reactive (NR).  01/19/2021 RPR non-reactive (NR).    Lab Results   Component Value Date/Time    [id number] 631 12/28/2023 0913    METHYLACID 0.11 12/28/2023 0914      1/31/2024 OCT RNFL OD 49 & OS 52. ([facility])    1/31/2024 HVF 24-2 OD fovea 35, wnl MD -1.44 & OS fovea 14, FL 12/19, FP 11%, FN 18%, generalized reduction MD -6.38.    This 48 year-old woman with a history of left optic neuritis, microscopic colitis, smoking, gastric bypass presents for evaluation of left optic neuritis.    Her examination show recovery from prior optic neuritis. The event a year prior likely was optic neuritis, too, as over a week of problems that were monocular is not consistent with migraine. Given changes on MRI scans from 3/21/2008 to 12/26/2023, this optic neuritis is very likely associated with multiple sclerosis although I will defer to neuro-immunologist Dr. Chu Blum on when criteria are fully satisfied. Further visual recovery is quite likely. Testing for neuromyelitis optica is pending and I recommend MOG antibody testing as well.    Plan    Check MOG antibody.  Continue evaluation with Dr. Blum & Dr. Sánchez.    Follow up in 1-2 months with HVF & OCT. (Dilated 1/31/2024)

## 2024-02-03 LAB — SCAN RESULT: NORMAL

## 2024-02-07 ENCOUNTER — HOSPITAL ENCOUNTER (OUTPATIENT)
Dept: RADIOLOGY | Facility: CLINIC | Age: 49
Discharge: HOME | End: 2024-02-07
Payer: COMMERCIAL

## 2024-02-07 DIAGNOSIS — H46.9 OPTIC NEURITIS: ICD-10-CM

## 2024-02-07 DIAGNOSIS — M35.00 SJOGREN SYNDROME, UNSPECIFIED (MULTI): ICD-10-CM

## 2024-02-07 PROCEDURE — 70553 MRI BRAIN STEM W/O & W/DYE: CPT | Performed by: RADIOLOGY

## 2024-02-07 PROCEDURE — 71250 CT THORAX DX C-: CPT | Performed by: RADIOLOGY

## 2024-02-07 PROCEDURE — 2500000004 HC RX 250 GENERAL PHARMACY W/ HCPCS (ALT 636 FOR OP/ED): Performed by: PSYCHIATRY & NEUROLOGY

## 2024-02-07 PROCEDURE — 70553 MRI BRAIN STEM W/O & W/DYE: CPT

## 2024-02-07 PROCEDURE — A9575 INJ GADOTERATE MEGLUMI 0.1ML: HCPCS | Performed by: PSYCHIATRY & NEUROLOGY

## 2024-02-07 PROCEDURE — 71250 CT THORAX DX C-: CPT

## 2024-02-07 RX ORDER — GADOTERATE MEGLUMINE 376.9 MG/ML
20 INJECTION INTRAVENOUS
Status: COMPLETED | OUTPATIENT
Start: 2024-02-07 | End: 2024-02-07

## 2024-02-07 RX ADMIN — GADOTERATE MEGLUMINE 20 ML: 376.9 INJECTION INTRAVENOUS at 10:28

## 2024-02-21 ENCOUNTER — LAB (OUTPATIENT)
Dept: LAB | Facility: LAB | Age: 49
End: 2024-02-21
Payer: COMMERCIAL

## 2024-02-21 DIAGNOSIS — H46.9 OPTIC NEURITIS, LEFT: ICD-10-CM

## 2024-02-21 PROCEDURE — 86363 MOG-IGG1 ANTB FLO CYTMTRY EA: CPT

## 2024-02-21 PROCEDURE — 36415 COLL VENOUS BLD VENIPUNCTURE: CPT

## 2024-02-26 LAB — MOG IGG1 SERPL QL FC: NEGATIVE

## 2024-02-28 ENCOUNTER — APPOINTMENT (OUTPATIENT)
Dept: RHEUMATOLOGY | Facility: CLINIC | Age: 49
End: 2024-02-28
Payer: COMMERCIAL

## 2024-03-06 ENCOUNTER — OFFICE VISIT (OUTPATIENT)
Dept: OPHTHALMOLOGY | Facility: CLINIC | Age: 49
End: 2024-03-06
Payer: COMMERCIAL

## 2024-03-06 DIAGNOSIS — H46.9 OPTIC NEURITIS, LEFT: Primary | ICD-10-CM

## 2024-03-06 NOTE — PROGRESS NOTES
Assessment and Plan    01/12/2024 MRI brain with contrast, which I personally reviewed, shows similar bihemispheric white matter FLAIR lesions.    12/27/2023 MRI brain & orbits with contrast, which I personally reviewed, shows enhancement of the left optic nerve intraorbital, canalicular and intracranial segments. There are bihemispheric white matter FLAIR lesions with significant right occipital horn dilation and periventricular white matter FLAIR changes.  03/21/2008 MRI brain with contrast, which I personally reviewed, shows no lesion with dilation of the occipital horn of the right lateral ventricle.    02/21/2024 MOG ab negative.  01/24/2024 syphilis non-reactive (NR). Lupus anticoagulant, anti-cardiolipin abs, beta-2-glycoprotein abs negative. RF <10.   12/28/2023 antinuclear antibodies test (TR[facility]ON) 1:320 with negative PRAKASH.  02/17/2023 B12 643. Folic acid wnl.  01/13/2022 syphilis non-reactive (NR).  01/19/2021 RPR non-reactive (NR).    Lab Results   Component Value Date/Time    [id number] 631 12/28/2023 0913    METHYLACID 0.11 12/28/2023 0914      1/31/2024 OCT RNFL OD 49 & OS 52. ([facility])    1/31/2024 HVF 24-2 OD fovea 35, wnl MD -1.44 & OS fovea 14, FL 12/19, FP 11%, FN 18%, generalized reduction MD -6.38.    This 48 year-old woman with a history of left optic neuritis, microscopic colitis, smoking, gastric bypass presents in follow up for evaluation of left optic neuritis.    Her examination show recovery from prior optic neuritis. The event a year prior likely was optic neuritis, too, as over a week of problems that were monocular is not consistent with migraine. Given changes on MRI scans from 3/21/2008 to 12/26/2023, this optic neuritis is very likely associated with multiple sclerosis although I will defer to neuro-immunologist Dr. Chu Blum on when criteria are fully satisfied. Further visual recovery is quite likely. Testing for neuromyelitis optica is pending and I recommend MOG antibody  testing as well.    Plan    Check MOG antibody.  Continue evaluation with Dr. Blum & Dr. Sánchez.    Follow up in 1-2 months with HVF & OCT. (Dilated 1/31/2024)

## 2024-04-01 ENCOUNTER — APPOINTMENT (OUTPATIENT)
Dept: OPHTHALMOLOGY | Facility: CLINIC | Age: 49
End: 2024-04-01
Payer: COMMERCIAL

## 2024-04-29 ENCOUNTER — APPOINTMENT (OUTPATIENT)
Dept: OPHTHALMOLOGY | Facility: CLINIC | Age: 49
End: 2024-04-29
Payer: COMMERCIAL